# Patient Record
Sex: FEMALE | Race: BLACK OR AFRICAN AMERICAN | Employment: FULL TIME | URBAN - METROPOLITAN AREA
[De-identification: names, ages, dates, MRNs, and addresses within clinical notes are randomized per-mention and may not be internally consistent; named-entity substitution may affect disease eponyms.]

---

## 2018-01-09 ENCOUNTER — OFFICE VISIT (OUTPATIENT)
Dept: FAMILY MEDICINE CLINIC | Age: 38
End: 2018-01-09
Payer: COMMERCIAL

## 2018-01-09 VITALS
WEIGHT: 213.4 LBS | OXYGEN SATURATION: 98 % | DIASTOLIC BLOOD PRESSURE: 72 MMHG | RESPIRATION RATE: 16 BRPM | BODY MASS INDEX: 36.43 KG/M2 | TEMPERATURE: 98.4 F | HEIGHT: 64 IN | SYSTOLIC BLOOD PRESSURE: 120 MMHG | HEART RATE: 80 BPM

## 2018-01-09 DIAGNOSIS — R30.0 DYSURIA: Primary | ICD-10-CM

## 2018-01-09 DIAGNOSIS — N30.90 CYSTITIS: ICD-10-CM

## 2018-01-09 DIAGNOSIS — J06.9 VIRAL URI: ICD-10-CM

## 2018-01-09 LAB
BILIRUBIN, POC: NEGATIVE
BLOOD URINE, POC: ABNORMAL
CLARITY, POC: ABNORMAL
COLOR, POC: ABNORMAL
GLUCOSE URINE, POC: NEGATIVE
KETONES, POC: NEGATIVE
LEUKOCYTE EST, POC: ABNORMAL
NITRITE, POC: NEGATIVE
PH, POC: 5.5
PROTEIN, POC: ABNORMAL
SPECIFIC GRAVITY, POC: 1.02
UROBILINOGEN, POC: ABNORMAL

## 2018-01-09 PROCEDURE — 99213 OFFICE O/P EST LOW 20 MIN: CPT | Performed by: FAMILY MEDICINE

## 2018-01-09 PROCEDURE — 81003 URINALYSIS AUTO W/O SCOPE: CPT | Performed by: FAMILY MEDICINE

## 2018-01-09 RX ORDER — SULFAMETHOXAZOLE AND TRIMETHOPRIM 800; 160 MG/1; MG/1
1 TABLET ORAL 2 TIMES DAILY
Qty: 20 TABLET | Refills: 0 | Status: SHIPPED | OUTPATIENT
Start: 2018-01-09 | End: 2018-01-19

## 2018-01-09 ASSESSMENT — PATIENT HEALTH QUESTIONNAIRE - PHQ9
2. FEELING DOWN, DEPRESSED OR HOPELESS: 0
SUM OF ALL RESPONSES TO PHQ QUESTIONS 1-9: 0
SUM OF ALL RESPONSES TO PHQ9 QUESTIONS 1 & 2: 0
1. LITTLE INTEREST OR PLEASURE IN DOING THINGS: 0

## 2018-01-09 ASSESSMENT — ENCOUNTER SYMPTOMS
WHEEZING: 0
GASTROINTESTINAL NEGATIVE: 1
SORE THROAT: 0
COUGH: 0
SHORTNESS OF BREATH: 0
RHINORRHEA: 1

## 2018-01-09 NOTE — PROGRESS NOTES
Visit Information    Have you changed or started any medications since your last visit including any over-the-counter medicines, vitamins, or herbal medicines? no   Are you having any side effects from any of your medications? -  no  Have you stopped taking any of your medications? Is so, why? -  no    Have you seen any other physician or provider since your last visit? No  Have you had any other diagnostic tests since your last visit? No  Have you been seen in the emergency room and/or had an admission to a hospital since we last saw you? No  Have you had your routine dental cleaning in the past 6 months? no    Have you activated your [employer] account? If not, what are your barriers?  Yes     Patient Care Team:  Elizabeth Arreola MD as PCP - General    Medical History Review  Past Medical, Family, and Social History reviewed and does not contribute to the patient presenting condition    Health Maintenance   Topic Date Due    HIV screen  06/07/1993    DTaP/Tdap/Td vaccine (1 - Tdap) 06/07/1997    Cervical cancer screen  06/24/2018    Flu vaccine  Completed

## 2018-01-11 LAB — URINE CULTURE, ROUTINE: ABNORMAL

## 2018-03-16 ENCOUNTER — HOSPITAL ENCOUNTER (EMERGENCY)
Age: 38
Discharge: HOME | End: 2018-03-16
Payer: COMMERCIAL

## 2018-03-16 VITALS
OXYGEN SATURATION: 98 % | BODY MASS INDEX: 36.54 KG/M2 | DIASTOLIC BLOOD PRESSURE: 83 MMHG | HEART RATE: 94 BPM | RESPIRATION RATE: 16 BRPM | HEIGHT: 64 IN | WEIGHT: 214 LBS | SYSTOLIC BLOOD PRESSURE: 140 MMHG | TEMPERATURE: 97.5 F

## 2018-03-16 DIAGNOSIS — N89.8 VAGINAL DISCHARGE: ICD-10-CM

## 2018-03-16 DIAGNOSIS — B96.89 BACTERIAL VAGINOSIS: Primary | ICD-10-CM

## 2018-03-16 DIAGNOSIS — N76.0 BACTERIAL VAGINOSIS: Primary | ICD-10-CM

## 2018-03-16 LAB
BILIRUBIN URINE: NEGATIVE
BLOOD, URINE: NEGATIVE
CHARACTER, URINE: CLEAR
CHLAMYDIA TRACHOMATIS BY RT-PCR: NOT DETECTED
COLOR: YELLOW
CT/NG SOURCE: NORMAL
GLUCOSE, URINE: NEGATIVE MG/DL
KETONES, URINE: NEGATIVE
LEUKOCYTES, UA: NEGATIVE
NEISSERIA GONORRHOEAE BY RT-PCR: NOT DETECTED
NITRATE, UA: NEGATIVE
PH UA: 6 (ref 5–9)
PROTEIN UA: NEGATIVE MG/DL
REFLEX TO URINE C & S: NORMAL
SPECIFIC GRAVITY UA: 1.02 (ref 1–1.03)
TRICHOMONAS PREP: NEGATIVE
UROBILINOGEN, URINE: 0.2 EU/DL (ref 0–1)

## 2018-03-16 PROCEDURE — 99213 OFFICE O/P EST LOW 20 MIN: CPT

## 2018-03-16 PROCEDURE — 87205 SMEAR GRAM STAIN: CPT

## 2018-03-16 PROCEDURE — 87808 TRICHOMONAS ASSAY W/OPTIC: CPT

## 2018-03-16 PROCEDURE — 81003 URINALYSIS AUTO W/O SCOPE: CPT

## 2018-03-16 PROCEDURE — 87491 CHLMYD TRACH DNA AMP PROBE: CPT

## 2018-03-16 PROCEDURE — 87591 N.GONORRHOEAE DNA AMP PROB: CPT

## 2018-03-16 PROCEDURE — 87070 CULTURE OTHR SPECIMN AEROBIC: CPT

## 2018-03-16 PROCEDURE — 99213 OFFICE O/P EST LOW 20 MIN: CPT | Performed by: NURSE PRACTITIONER

## 2018-03-16 RX ORDER — METRONIDAZOLE 500 MG/1
500 TABLET ORAL 2 TIMES DAILY
Qty: 14 TABLET | Refills: 0 | Status: SHIPPED | OUTPATIENT
Start: 2018-03-16 | End: 2018-03-23

## 2018-03-16 ASSESSMENT — ENCOUNTER SYMPTOMS
WHEEZING: 0
SHORTNESS OF BREATH: 0
SORE THROAT: 0
DIARRHEA: 0
ABDOMINAL PAIN: 0
NAUSEA: 0
COUGH: 0
CONSTIPATION: 0

## 2018-03-19 LAB
GENITAL CULTURE, ROUTINE: NORMAL
GRAM STAIN RESULT: NORMAL

## 2018-06-14 ENCOUNTER — HOSPITAL ENCOUNTER (OUTPATIENT)
Age: 38
Setting detail: OBSERVATION
Discharge: HOME | End: 2018-06-15
Attending: FAMILY MEDICINE | Admitting: INTERNAL MEDICINE
Payer: COMMERCIAL

## 2018-06-14 ENCOUNTER — APPOINTMENT (OUTPATIENT)
Dept: GENERAL RADIOLOGY | Age: 38
End: 2018-06-14
Payer: COMMERCIAL

## 2018-06-14 DIAGNOSIS — I47.1 SVT (SUPRAVENTRICULAR TACHYCARDIA) (HCC): Primary | ICD-10-CM

## 2018-06-14 PROBLEM — I47.10 SVT (SUPRAVENTRICULAR TACHYCARDIA): Status: ACTIVE | Noted: 2018-06-14

## 2018-06-14 LAB
ALBUMIN SERPL-MCNC: 4.1 G/DL (ref 3.5–5.1)
ALP BLD-CCNC: 57 U/L (ref 38–126)
ALT SERPL-CCNC: 18 U/L (ref 11–66)
AMPHETAMINE+METHAMPHETAMINE URINE SCREEN: NEGATIVE
ANION GAP SERPL CALCULATED.3IONS-SCNC: 16 MEQ/L (ref 8–16)
AST SERPL-CCNC: 21 U/L (ref 5–40)
BACTERIA: ABNORMAL /HPF
BARBITURATE QUANTITATIVE URINE: NEGATIVE
BASOPHILS # BLD: 0.6 %
BASOPHILS ABSOLUTE: 0 THOU/MM3 (ref 0–0.1)
BENZODIAZEPINE QUANTITATIVE URINE: NEGATIVE
BILIRUB SERPL-MCNC: 0.7 MG/DL (ref 0.3–1.2)
BILIRUBIN DIRECT: < 0.2 MG/DL (ref 0–0.3)
BILIRUBIN URINE: NEGATIVE
BLOOD, URINE: ABNORMAL
BUN BLDV-MCNC: 14 MG/DL (ref 7–22)
CALCIUM SERPL-MCNC: 8.9 MG/DL (ref 8.5–10.5)
CANNABINOID QUANTITATIVE URINE: NEGATIVE
CASTS 2: ABNORMAL /LPF
CASTS UA: ABNORMAL /LPF
CHARACTER, URINE: CLEAR
CHLORIDE BLD-SCNC: 105 MEQ/L (ref 98–111)
CO2: 22 MEQ/L (ref 23–33)
COCAINE METABOLITE QUANTITATIVE URINE: NEGATIVE
COLOR: YELLOW
CREAT SERPL-MCNC: 0.7 MG/DL (ref 0.4–1.2)
CRYSTALS, UA: ABNORMAL
D-DIMER QUANTITATIVE: 333 NG/ML FEU (ref 0–500)
EOSINOPHIL # BLD: 1.6 %
EOSINOPHILS ABSOLUTE: 0.1 THOU/MM3 (ref 0–0.4)
EPITHELIAL CELLS, UA: ABNORMAL /HPF
GFR SERPL CREATININE-BSD FRML MDRD: > 90 ML/MIN/1.73M2
GLUCOSE BLD-MCNC: 125 MG/DL (ref 70–108)
GLUCOSE URINE: NEGATIVE MG/DL
HCT VFR BLD CALC: 38.6 % (ref 37–47)
HEMOGLOBIN: 13 GM/DL (ref 12–16)
KETONES, URINE: NEGATIVE
LEUKOCYTE ESTERASE, URINE: NEGATIVE
LYMPHOCYTES # BLD: 33.4 %
LYMPHOCYTES ABSOLUTE: 1.6 THOU/MM3 (ref 1–4.8)
MAGNESIUM: 2.1 MG/DL (ref 1.6–2.4)
MCH RBC QN AUTO: 31 PG (ref 27–31)
MCHC RBC AUTO-ENTMCNC: 33.6 GM/DL (ref 33–37)
MCV RBC AUTO: 92.2 FL (ref 81–99)
MISCELLANEOUS 2: ABNORMAL
MONOCYTES # BLD: 5.2 %
MONOCYTES ABSOLUTE: 0.3 THOU/MM3 (ref 0.4–1.3)
NITRITE, URINE: NEGATIVE
NUCLEATED RED BLOOD CELLS: 0 /100 WBC
OPIATES, URINE: NEGATIVE
OSMOLALITY CALCULATION: 286.9 MOSMOL/KG (ref 275–300)
OXYCODONE: NEGATIVE
PDW BLD-RTO: 12.7 % (ref 11.5–14.5)
PH UA: 6
PHENCYCLIDINE QUANTITATIVE URINE: NEGATIVE
PLATELET # BLD: 270 THOU/MM3 (ref 130–400)
PMV BLD AUTO: 8.4 FL (ref 7.4–10.4)
POTASSIUM SERPL-SCNC: 3.4 MEQ/L (ref 3.5–5.2)
PREGNANCY, SERUM: NEGATIVE
PRO-BNP: 33.4 PG/ML (ref 0–450)
PROTEIN UA: NEGATIVE
RBC # BLD: 4.18 MILL/MM3 (ref 4.2–5.4)
RBC URINE: ABNORMAL /HPF
RENAL EPITHELIAL, UA: ABNORMAL
SEG NEUTROPHILS: 59.2 %
SEGMENTED NEUTROPHILS ABSOLUTE COUNT: 2.9 THOU/MM3 (ref 1.8–7.7)
SODIUM BLD-SCNC: 143 MEQ/L (ref 135–145)
SPECIFIC GRAVITY, URINE: 1.01 (ref 1–1.03)
TOTAL PROTEIN: 7.6 G/DL (ref 6.1–8)
TROPONIN T: < 0.01 NG/ML
TSH SERPL DL<=0.05 MIU/L-ACNC: 1.04 UIU/ML (ref 0.4–4.2)
TSH SERPL DL<=0.05 MIU/L-ACNC: 1.06 UIU/ML (ref 0.4–4.2)
UROBILINOGEN, URINE: 0.2 EU/DL
WBC # BLD: 4.9 THOU/MM3 (ref 4.8–10.8)
WBC UA: ABNORMAL /HPF
YEAST: ABNORMAL

## 2018-06-14 PROCEDURE — G0378 HOSPITAL OBSERVATION PER HR: HCPCS

## 2018-06-14 PROCEDURE — 6370000000 HC RX 637 (ALT 250 FOR IP): Performed by: INTERNAL MEDICINE

## 2018-06-14 PROCEDURE — 6360000002 HC RX W HCPCS: Performed by: INTERNAL MEDICINE

## 2018-06-14 PROCEDURE — 36415 COLL VENOUS BLD VENIPUNCTURE: CPT

## 2018-06-14 PROCEDURE — 96374 THER/PROPH/DIAG INJ IV PUSH: CPT

## 2018-06-14 PROCEDURE — 93005 ELECTROCARDIOGRAM TRACING: CPT | Performed by: FAMILY MEDICINE

## 2018-06-14 PROCEDURE — 96372 THER/PROPH/DIAG INJ SC/IM: CPT

## 2018-06-14 PROCEDURE — 83880 ASSAY OF NATRIURETIC PEPTIDE: CPT

## 2018-06-14 PROCEDURE — 84703 CHORIONIC GONADOTROPIN ASSAY: CPT

## 2018-06-14 PROCEDURE — 82248 BILIRUBIN DIRECT: CPT

## 2018-06-14 PROCEDURE — 85379 FIBRIN DEGRADATION QUANT: CPT

## 2018-06-14 PROCEDURE — 80053 COMPREHEN METABOLIC PANEL: CPT

## 2018-06-14 PROCEDURE — 83735 ASSAY OF MAGNESIUM: CPT

## 2018-06-14 PROCEDURE — 84443 ASSAY THYROID STIM HORMONE: CPT

## 2018-06-14 PROCEDURE — 2580000003 HC RX 258: Performed by: INTERNAL MEDICINE

## 2018-06-14 PROCEDURE — 81001 URINALYSIS AUTO W/SCOPE: CPT

## 2018-06-14 PROCEDURE — 99285 EMERGENCY DEPT VISIT HI MDM: CPT

## 2018-06-14 PROCEDURE — 80307 DRUG TEST PRSMV CHEM ANLYZR: CPT

## 2018-06-14 PROCEDURE — 71045 X-RAY EXAM CHEST 1 VIEW: CPT

## 2018-06-14 PROCEDURE — 6360000002 HC RX W HCPCS

## 2018-06-14 PROCEDURE — 84484 ASSAY OF TROPONIN QUANT: CPT

## 2018-06-14 PROCEDURE — 85025 COMPLETE CBC W/AUTO DIFF WBC: CPT

## 2018-06-14 RX ORDER — POTASSIUM CHLORIDE 20 MEQ/1
40 TABLET, EXTENDED RELEASE ORAL ONCE
Status: DISCONTINUED | OUTPATIENT
Start: 2018-06-15 | End: 2018-06-14

## 2018-06-14 RX ORDER — SODIUM CHLORIDE 0.9 % (FLUSH) 0.9 %
10 SYRINGE (ML) INJECTION PRN
Status: DISCONTINUED | OUTPATIENT
Start: 2018-06-14 | End: 2018-06-15 | Stop reason: HOSPADM

## 2018-06-14 RX ORDER — ASPIRIN 81 MG/1
81 TABLET, CHEWABLE ORAL DAILY
Status: DISCONTINUED | OUTPATIENT
Start: 2018-06-14 | End: 2018-06-15 | Stop reason: HOSPADM

## 2018-06-14 RX ORDER — POTASSIUM CHLORIDE 20 MEQ/1
40 TABLET, EXTENDED RELEASE ORAL ONCE
Status: COMPLETED | OUTPATIENT
Start: 2018-06-14 | End: 2018-06-14

## 2018-06-14 RX ORDER — ADENOSINE 3 MG/ML
INJECTION, SOLUTION INTRAVENOUS
Status: COMPLETED
Start: 2018-06-14 | End: 2018-06-14

## 2018-06-14 RX ORDER — SODIUM CHLORIDE 0.9 % (FLUSH) 0.9 %
10 SYRINGE (ML) INJECTION EVERY 12 HOURS SCHEDULED
Status: DISCONTINUED | OUTPATIENT
Start: 2018-06-14 | End: 2018-06-15 | Stop reason: HOSPADM

## 2018-06-14 RX ORDER — CETIRIZINE HYDROCHLORIDE 10 MG/1
10 TABLET ORAL DAILY
COMMUNITY
End: 2018-07-18

## 2018-06-14 RX ORDER — ONDANSETRON 2 MG/ML
4 INJECTION INTRAMUSCULAR; INTRAVENOUS EVERY 6 HOURS PRN
Status: DISCONTINUED | OUTPATIENT
Start: 2018-06-14 | End: 2018-06-15 | Stop reason: HOSPADM

## 2018-06-14 RX ORDER — LANOLIN ALCOHOL/MO/W.PET/CERES
3 CREAM (GRAM) TOPICAL NIGHTLY PRN
COMMUNITY
End: 2018-07-18

## 2018-06-14 RX ORDER — ADENOSINE 3 MG/ML
6 INJECTION, SOLUTION INTRAVENOUS ONCE
Status: COMPLETED | OUTPATIENT
Start: 2018-06-14 | End: 2018-06-14

## 2018-06-14 RX ADMIN — ADENOSINE 6 MG: 3 INJECTION, SOLUTION INTRAVENOUS at 14:38

## 2018-06-14 RX ADMIN — ASPIRIN 81 MG 81 MG: 81 TABLET ORAL at 19:48

## 2018-06-14 RX ADMIN — ENOXAPARIN SODIUM 40 MG: 40 INJECTION SUBCUTANEOUS at 19:48

## 2018-06-14 RX ADMIN — POTASSIUM CHLORIDE 40 MEQ: 20 TABLET, EXTENDED RELEASE ORAL at 21:17

## 2018-06-14 RX ADMIN — Medication 10 ML: at 19:48

## 2018-06-14 ASSESSMENT — ENCOUNTER SYMPTOMS
VOMITING: 0
RHINORRHEA: 0
NAUSEA: 0
COUGH: 0
WHEEZING: 0
EYE DISCHARGE: 0
BACK PAIN: 0
ABDOMINAL PAIN: 0
DIARRHEA: 0
SHORTNESS OF BREATH: 1
EYE PAIN: 0
SORE THROAT: 0

## 2018-06-14 ASSESSMENT — PAIN SCALES - GENERAL: PAINLEVEL_OUTOF10: 0

## 2018-06-15 ENCOUNTER — TELEPHONE (OUTPATIENT)
Dept: CARDIOLOGY CLINIC | Age: 38
End: 2018-06-15

## 2018-06-15 ENCOUNTER — TELEPHONE (OUTPATIENT)
Dept: FAMILY MEDICINE CLINIC | Age: 38
End: 2018-06-15

## 2018-06-15 VITALS
SYSTOLIC BLOOD PRESSURE: 135 MMHG | OXYGEN SATURATION: 100 % | DIASTOLIC BLOOD PRESSURE: 93 MMHG | HEART RATE: 84 BPM | BODY MASS INDEX: 36.06 KG/M2 | HEIGHT: 64 IN | WEIGHT: 211.2 LBS | TEMPERATURE: 97.9 F | RESPIRATION RATE: 16 BRPM

## 2018-06-15 LAB
ANION GAP SERPL CALCULATED.3IONS-SCNC: 12 MEQ/L (ref 8–16)
BUN BLDV-MCNC: 14 MG/DL (ref 7–22)
CALCIUM SERPL-MCNC: 9 MG/DL (ref 8.5–10.5)
CHLORIDE BLD-SCNC: 106 MEQ/L (ref 98–111)
CO2: 24 MEQ/L (ref 23–33)
CREAT SERPL-MCNC: 0.6 MG/DL (ref 0.4–1.2)
EKG ATRIAL RATE: 115 BPM
EKG ATRIAL RATE: 200 BPM
EKG P AXIS: 37 DEGREES
EKG P-R INTERVAL: 144 MS
EKG Q-T INTERVAL: 226 MS
EKG Q-T INTERVAL: 316 MS
EKG QRS DURATION: 90 MS
EKG QRS DURATION: 96 MS
EKG QTC CALCULATION (BAZETT): 412 MS
EKG QTC CALCULATION (BAZETT): 437 MS
EKG R AXIS: 2 DEGREES
EKG R AXIS: 4 DEGREES
EKG T AXIS: -7 DEGREES
EKG T AXIS: 24 DEGREES
EKG VENTRICULAR RATE: 115 BPM
EKG VENTRICULAR RATE: 200 BPM
GFR SERPL CREATININE-BSD FRML MDRD: > 90 ML/MIN/1.73M2
GLUCOSE BLD-MCNC: 101 MG/DL (ref 70–108)
HCT VFR BLD CALC: 34.9 % (ref 37–47)
HEMOGLOBIN: 12.2 GM/DL (ref 12–16)
LV EF: 53 %
LVEF MODALITY: NORMAL
MCH RBC QN AUTO: 31.9 PG (ref 27–31)
MCHC RBC AUTO-ENTMCNC: 35 GM/DL (ref 33–37)
MCV RBC AUTO: 91.1 FL (ref 81–99)
PDW BLD-RTO: 13.2 % (ref 11.5–14.5)
PLATELET # BLD: 266 THOU/MM3 (ref 130–400)
PMV BLD AUTO: 8.1 FL (ref 7.4–10.4)
POTASSIUM REFLEX MAGNESIUM: 4.2 MEQ/L (ref 3.5–5.2)
RBC # BLD: 3.83 MILL/MM3 (ref 4.2–5.4)
SODIUM BLD-SCNC: 142 MEQ/L (ref 135–145)
WBC # BLD: 6.7 THOU/MM3 (ref 4.8–10.8)

## 2018-06-15 PROCEDURE — 99223 1ST HOSP IP/OBS HIGH 75: CPT | Performed by: INTERNAL MEDICINE

## 2018-06-15 PROCEDURE — 80048 BASIC METABOLIC PNL TOTAL CA: CPT

## 2018-06-15 PROCEDURE — G0378 HOSPITAL OBSERVATION PER HR: HCPCS

## 2018-06-15 PROCEDURE — 6370000000 HC RX 637 (ALT 250 FOR IP): Performed by: INTERNAL MEDICINE

## 2018-06-15 PROCEDURE — 36415 COLL VENOUS BLD VENIPUNCTURE: CPT

## 2018-06-15 PROCEDURE — 93010 ELECTROCARDIOGRAM REPORT: CPT | Performed by: INTERNAL MEDICINE

## 2018-06-15 PROCEDURE — 93270 REMOTE 30 DAY ECG REV/REPORT: CPT

## 2018-06-15 PROCEDURE — 85027 COMPLETE CBC AUTOMATED: CPT

## 2018-06-15 PROCEDURE — 2580000003 HC RX 258: Performed by: INTERNAL MEDICINE

## 2018-06-15 PROCEDURE — 93306 TTE W/DOPPLER COMPLETE: CPT

## 2018-06-15 RX ORDER — ASPIRIN 81 MG/1
81 TABLET, CHEWABLE ORAL DAILY
Qty: 30 TABLET | Refills: 3 | Status: SHIPPED | OUTPATIENT
Start: 2018-06-16 | End: 2018-07-18

## 2018-06-15 RX ADMIN — Medication 10 ML: at 09:10

## 2018-06-15 RX ADMIN — ASPIRIN 81 MG 81 MG: 81 TABLET ORAL at 09:11

## 2018-06-15 ASSESSMENT — PAIN SCALES - GENERAL
PAINLEVEL_OUTOF10: 0

## 2018-06-15 NOTE — TELEPHONE ENCOUNTER
.Transition of Care visit scheduled.   6/26/2018  Patient is being discharged to home from [address] [place] [address],[address]

## 2018-06-16 ENCOUNTER — CARE COORDINATION (OUTPATIENT)
Dept: CASE MANAGEMENT | Age: 38
End: 2018-06-16

## 2018-06-16 DIAGNOSIS — I47.1 SVT (SUPRAVENTRICULAR TACHYCARDIA) (HCC): Primary | ICD-10-CM

## 2018-06-16 PROCEDURE — 1111F DSCHRG MED/CURRENT MED MERGE: CPT | Performed by: FAMILY MEDICINE

## 2018-07-18 ENCOUNTER — OFFICE VISIT (OUTPATIENT)
Dept: CARDIOLOGY CLINIC | Age: 38
End: 2018-07-18
Payer: COMMERCIAL

## 2018-07-18 VITALS
HEIGHT: 65 IN | BODY MASS INDEX: 35.49 KG/M2 | WEIGHT: 213 LBS | HEART RATE: 90 BPM | SYSTOLIC BLOOD PRESSURE: 130 MMHG | DIASTOLIC BLOOD PRESSURE: 88 MMHG

## 2018-07-18 DIAGNOSIS — I47.1 PAROXYSMAL SVT (SUPRAVENTRICULAR TACHYCARDIA) (HCC): Primary | ICD-10-CM

## 2018-07-18 PROCEDURE — 99213 OFFICE O/P EST LOW 20 MIN: CPT | Performed by: PHYSICIAN ASSISTANT

## 2019-02-05 ENCOUNTER — HOSPITAL ENCOUNTER (OUTPATIENT)
Dept: WOMENS IMAGING | Age: 39
Discharge: HOME | End: 2019-02-05
Payer: COMMERCIAL

## 2019-02-05 DIAGNOSIS — Z12.31 VISIT FOR SCREENING MAMMOGRAM: ICD-10-CM

## 2019-02-05 PROCEDURE — 77063 BREAST TOMOSYNTHESIS BI: CPT

## 2019-07-01 ENCOUNTER — OFFICE VISIT (OUTPATIENT)
Dept: FAMILY MEDICINE CLINIC | Age: 39
End: 2019-07-01
Payer: COMMERCIAL

## 2019-07-01 VITALS
RESPIRATION RATE: 16 BRPM | TEMPERATURE: 98.1 F | HEIGHT: 67 IN | SYSTOLIC BLOOD PRESSURE: 136 MMHG | DIASTOLIC BLOOD PRESSURE: 80 MMHG | HEART RATE: 104 BPM | BODY MASS INDEX: 33.74 KG/M2 | WEIGHT: 215 LBS

## 2019-07-01 DIAGNOSIS — Z13.31 POSITIVE DEPRESSION SCREENING: ICD-10-CM

## 2019-07-01 DIAGNOSIS — F34.1 DYSTHYMIA: Primary | ICD-10-CM

## 2019-07-01 PROCEDURE — G8431 POS CLIN DEPRES SCRN F/U DOC: HCPCS | Performed by: FAMILY MEDICINE

## 2019-07-01 PROCEDURE — 99213 OFFICE O/P EST LOW 20 MIN: CPT | Performed by: FAMILY MEDICINE

## 2019-07-01 ASSESSMENT — PATIENT HEALTH QUESTIONNAIRE - PHQ9
SUM OF ALL RESPONSES TO PHQ QUESTIONS 1-9: 16
7. TROUBLE CONCENTRATING ON THINGS, SUCH AS READING THE NEWSPAPER OR WATCHING TELEVISION: 2
5. POOR APPETITE OR OVEREATING: 2
10. IF YOU CHECKED OFF ANY PROBLEMS, HOW DIFFICULT HAVE THESE PROBLEMS MADE IT FOR YOU TO DO YOUR WORK, TAKE CARE OF THINGS AT HOME, OR GET ALONG WITH OTHER PEOPLE: 1
3. TROUBLE FALLING OR STAYING ASLEEP: 2
9. THOUGHTS THAT YOU WOULD BE BETTER OFF DEAD, OR OF HURTING YOURSELF: 0
8. MOVING OR SPEAKING SO SLOWLY THAT OTHER PEOPLE COULD HAVE NOTICED. OR THE OPPOSITE, BEING SO FIGETY OR RESTLESS THAT YOU HAVE BEEN MOVING AROUND A LOT MORE THAN USUAL: 2
SUM OF ALL RESPONSES TO PHQ QUESTIONS 1-9: 16
2. FEELING DOWN, DEPRESSED OR HOPELESS: 2
4. FEELING TIRED OR HAVING LITTLE ENERGY: 2
6. FEELING BAD ABOUT YOURSELF - OR THAT YOU ARE A FAILURE OR HAVE LET YOURSELF OR YOUR FAMILY DOWN: 2
SUM OF ALL RESPONSES TO PHQ9 QUESTIONS 1 & 2: 4
1. LITTLE INTEREST OR PLEASURE IN DOING THINGS: 2

## 2019-07-01 ASSESSMENT — ENCOUNTER SYMPTOMS: GASTROINTESTINAL NEGATIVE: 1

## 2019-07-02 ENCOUNTER — TELEPHONE (OUTPATIENT)
Dept: FAMILY MEDICINE CLINIC | Age: 39
End: 2019-07-02

## 2019-08-14 ENCOUNTER — NURSE ONLY (OUTPATIENT)
Dept: LAB | Age: 39
End: 2019-08-14

## 2019-08-16 LAB — HIV-2 AB: NEGATIVE

## 2019-09-09 ENCOUNTER — OFFICE VISIT (OUTPATIENT)
Dept: FAMILY MEDICINE CLINIC | Age: 39
End: 2019-09-09
Payer: COMMERCIAL

## 2019-09-09 VITALS
RESPIRATION RATE: 16 BRPM | HEART RATE: 76 BPM | WEIGHT: 207 LBS | BODY MASS INDEX: 35.34 KG/M2 | SYSTOLIC BLOOD PRESSURE: 122 MMHG | HEIGHT: 64 IN | DIASTOLIC BLOOD PRESSURE: 68 MMHG

## 2019-09-09 DIAGNOSIS — F34.1 DYSTHYMIA: ICD-10-CM

## 2019-09-09 DIAGNOSIS — Z13.31 POSITIVE DEPRESSION SCREENING: ICD-10-CM

## 2019-09-09 DIAGNOSIS — F43.12 CHRONIC POST-TRAUMATIC STRESS DISORDER (PTSD): Primary | ICD-10-CM

## 2019-09-09 PROCEDURE — 99213 OFFICE O/P EST LOW 20 MIN: CPT | Performed by: FAMILY MEDICINE
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
## 2019-10-31 ENCOUNTER — APPOINTMENT (OUTPATIENT)
Dept: GENERAL RADIOLOGY | Age: 39
End: 2019-10-31
Payer: COMMERCIAL

## 2019-10-31 ENCOUNTER — HOSPITAL ENCOUNTER (EMERGENCY)
Age: 39
Discharge: HOME | End: 2019-10-31
Attending: EMERGENCY MEDICINE
Payer: COMMERCIAL

## 2019-10-31 VITALS
WEIGHT: 206 LBS | OXYGEN SATURATION: 100 % | TEMPERATURE: 98.1 F | HEART RATE: 100 BPM | DIASTOLIC BLOOD PRESSURE: 81 MMHG | SYSTOLIC BLOOD PRESSURE: 122 MMHG | RESPIRATION RATE: 18 BRPM | BODY MASS INDEX: 35.17 KG/M2 | HEIGHT: 64 IN

## 2019-10-31 DIAGNOSIS — F41.9 ANXIETY DISORDER, UNSPECIFIED TYPE: Primary | ICD-10-CM

## 2019-10-31 DIAGNOSIS — R07.89 ATYPICAL CHEST PAIN: ICD-10-CM

## 2019-10-31 LAB
ALBUMIN SERPL-MCNC: 4.5 G/DL (ref 3.5–5.1)
ALP BLD-CCNC: 67 U/L (ref 38–126)
ALT SERPL-CCNC: 26 U/L (ref 11–66)
ANION GAP SERPL CALCULATED.3IONS-SCNC: 16 MEQ/L (ref 8–16)
AST SERPL-CCNC: 31 U/L (ref 5–40)
BASOPHILS # BLD: 0.1 %
BASOPHILS ABSOLUTE: 0 THOU/MM3 (ref 0–0.1)
BILIRUB SERPL-MCNC: 0.8 MG/DL (ref 0.3–1.2)
BUN BLDV-MCNC: 21 MG/DL (ref 7–22)
CALCIUM SERPL-MCNC: 9.6 MG/DL (ref 8.5–10.5)
CHLORIDE BLD-SCNC: 102 MEQ/L (ref 98–111)
CO2: 20 MEQ/L (ref 23–33)
CREAT SERPL-MCNC: 0.8 MG/DL (ref 0.4–1.2)
D-DIMER QUANTITATIVE: 295 NG/ML FEU (ref 0–500)
EKG ATRIAL RATE: 110 BPM
EKG P AXIS: 58 DEGREES
EKG P-R INTERVAL: 172 MS
EKG Q-T INTERVAL: 316 MS
EKG QRS DURATION: 92 MS
EKG QTC CALCULATION (BAZETT): 427 MS
EKG R AXIS: 16 DEGREES
EKG T AXIS: 37 DEGREES
EKG VENTRICULAR RATE: 110 BPM
EOSINOPHIL # BLD: 1.4 %
EOSINOPHILS ABSOLUTE: 0.1 THOU/MM3 (ref 0–0.4)
ERYTHROCYTE [DISTWIDTH] IN BLOOD BY AUTOMATED COUNT: 12.8 % (ref 11.5–14.5)
ERYTHROCYTE [DISTWIDTH] IN BLOOD BY AUTOMATED COUNT: 44.3 FL (ref 35–45)
GFR SERPL CREATININE-BSD FRML MDRD: > 90 ML/MIN/1.73M2
GLUCOSE BLD-MCNC: 119 MG/DL (ref 70–108)
HCT VFR BLD CALC: 42 % (ref 37–47)
HEMOGLOBIN: 14 GM/DL (ref 12–16)
IMMATURE GRANS (ABS): 0.01 THOU/MM3 (ref 0–0.07)
IMMATURE GRANULOCYTES: 0.1 %
LYMPHOCYTES # BLD: 49.6 %
LYMPHOCYTES ABSOLUTE: 3.9 THOU/MM3 (ref 1–4.8)
MCH RBC QN AUTO: 31.5 PG (ref 26–33)
MCHC RBC AUTO-ENTMCNC: 33.3 GM/DL (ref 32.2–35.5)
MCV RBC AUTO: 94.4 FL (ref 81–99)
MONOCYTES # BLD: 4.9 %
MONOCYTES ABSOLUTE: 0.4 THOU/MM3 (ref 0.4–1.3)
NUCLEATED RED BLOOD CELLS: 0 /100 WBC
OSMOLALITY CALCULATION: 279.8 MOSMOL/KG (ref 275–300)
PLATELET # BLD: 321 THOU/MM3 (ref 130–400)
PMV BLD AUTO: 9.6 FL (ref 9.4–12.4)
POTASSIUM REFLEX MAGNESIUM: 3.8 MEQ/L (ref 3.5–5.2)
POTASSIUM SERPL-SCNC: 3.8 MEQ/L (ref 3.5–5.2)
PRO-BNP: < 5 PG/ML (ref 0–450)
RBC # BLD: 4.45 MILL/MM3 (ref 4.2–5.4)
SEG NEUTROPHILS: 43.9 %
SEGMENTED NEUTROPHILS ABSOLUTE COUNT: 3.4 THOU/MM3 (ref 1.8–7.7)
SODIUM BLD-SCNC: 138 MEQ/L (ref 135–145)
TOTAL PROTEIN: 8.1 G/DL (ref 6.1–8)
TROPONIN T: < 0.01 NG/ML
WBC # BLD: 7.8 THOU/MM3 (ref 4.8–10.8)

## 2019-10-31 PROCEDURE — 99285 EMERGENCY DEPT VISIT HI MDM: CPT

## 2019-10-31 PROCEDURE — 93005 ELECTROCARDIOGRAM TRACING: CPT | Performed by: EMERGENCY MEDICINE

## 2019-10-31 PROCEDURE — 80048 BASIC METABOLIC PNL TOTAL CA: CPT

## 2019-10-31 PROCEDURE — 80053 COMPREHEN METABOLIC PANEL: CPT

## 2019-10-31 PROCEDURE — 36415 COLL VENOUS BLD VENIPUNCTURE: CPT

## 2019-10-31 PROCEDURE — 83880 ASSAY OF NATRIURETIC PEPTIDE: CPT

## 2019-10-31 PROCEDURE — 85379 FIBRIN DEGRADATION QUANT: CPT

## 2019-10-31 PROCEDURE — 85025 COMPLETE CBC W/AUTO DIFF WBC: CPT

## 2019-10-31 PROCEDURE — 71046 X-RAY EXAM CHEST 2 VIEWS: CPT

## 2019-10-31 PROCEDURE — 84484 ASSAY OF TROPONIN QUANT: CPT

## 2019-10-31 RX ORDER — BUSPIRONE HYDROCHLORIDE 10 MG/1
10 TABLET ORAL 2 TIMES DAILY
Qty: 60 TABLET | Refills: 0 | Status: SHIPPED | OUTPATIENT
Start: 2019-10-31 | End: 2019-11-30

## 2019-10-31 ASSESSMENT — ENCOUNTER SYMPTOMS
VOICE CHANGE: 0
DIARRHEA: 0
SHORTNESS OF BREATH: 1
TROUBLE SWALLOWING: 0
CHEST TIGHTNESS: 1
WHEEZING: 0
RHINORRHEA: 0
ABDOMINAL PAIN: 0
VOMITING: 0
SINUS PRESSURE: 0
SORE THROAT: 0
BACK PAIN: 0
COUGH: 0
CONSTIPATION: 0
NAUSEA: 0

## 2019-10-31 ASSESSMENT — PAIN DESCRIPTION - DESCRIPTORS: DESCRIPTORS: PRESSURE

## 2019-10-31 ASSESSMENT — PAIN DESCRIPTION - LOCATION: LOCATION: CHEST

## 2019-10-31 ASSESSMENT — PAIN DESCRIPTION - FREQUENCY: FREQUENCY: CONTINUOUS

## 2019-10-31 ASSESSMENT — PAIN SCALES - GENERAL: PAINLEVEL_OUTOF10: 8

## 2019-10-31 ASSESSMENT — PAIN DESCRIPTION - PAIN TYPE: TYPE: ACUTE PAIN

## 2019-11-01 ENCOUNTER — TELEPHONE (OUTPATIENT)
Dept: FAMILY MEDICINE CLINIC | Age: 39
End: 2019-11-01

## 2019-11-02 PROCEDURE — 93010 ELECTROCARDIOGRAM REPORT: CPT | Performed by: INTERNAL MEDICINE

## 2019-11-05 ENCOUNTER — HOSPITAL ENCOUNTER (EMERGENCY)
Age: 39
Discharge: HOME | End: 2019-11-05
Payer: COMMERCIAL

## 2019-11-05 VITALS
SYSTOLIC BLOOD PRESSURE: 145 MMHG | HEIGHT: 64 IN | WEIGHT: 206 LBS | RESPIRATION RATE: 16 BRPM | TEMPERATURE: 97.9 F | BODY MASS INDEX: 35.17 KG/M2 | HEART RATE: 84 BPM | DIASTOLIC BLOOD PRESSURE: 89 MMHG | OXYGEN SATURATION: 99 %

## 2019-11-05 DIAGNOSIS — R59.9 LYMPH NODE ENLARGEMENT: Primary | ICD-10-CM

## 2019-11-05 LAB
BASOPHILS # BLD: 0.2 %
BASOPHILS ABSOLUTE: 0 THOU/MM3 (ref 0–0.1)
BUN WHOLE BLOOD, UC: 13 MG/DL (ref 8–26)
CHLORIDE, WHOLE BLOOD: 103 MEQ/L (ref 98–109)
CO2, WHOLE BLOOD: 25 MEQ/L (ref 23–33)
CREATININE WHOLE BLOOD, UC: 0.7 MG/DL (ref 0.5–1.2)
EOSINOPHIL # BLD: 0.3 %
EOSINOPHILS ABSOLUTE: 0 THOU/MM3 (ref 0–0.4)
GFR, ESTIMATED: > 90 ML/MIN/1.73M2
GLUCOSE, WHOLE BLOOD: 94 MG/DL (ref 70–108)
HCT VFR BLD CALC: 36.9 % (ref 37–47)
HEMOGLOBIN: 12.1 GM/DL (ref 12–16)
IMMATURE GRANS (ABS): 0.02 THOU/MM3 (ref 0–0.07)
IMMATURE GRANULOCYTES: 0.3 %
LYMPHOCYTES # BLD: 25.4 %
LYMPHOCYTES ABSOLUTE: 1.4 THOU/MM3 (ref 1–4.8)
MCH RBC QN AUTO: 30.8 PG (ref 27–31)
MCHC RBC AUTO-ENTMCNC: 32.9 GM/DL (ref 33–37)
MCV RBC AUTO: 94 FL (ref 81–99)
MONOCYTES # BLD: 10.8 %
MONOCYTES ABSOLUTE: 0.6 THOU/MM3 (ref 0.4–1.3)
NUCLEATED RED BLOOD CELLS: 0 /100 WBC
PDW BLD-RTO: 13.6 % (ref 11.5–14.5)
PLATELET # BLD: 257 THOU/MM3 (ref 130–400)
PMV BLD AUTO: 7.4 FL (ref 7.4–10.4)
POTASSIUM, WHOLE BLOOD: 3.6 MEQ/L (ref 3.5–4.9)
RBC # BLD: 3.94 MILL/MM3 (ref 4.2–5.4)
SEG NEUTROPHILS: 63 %
SEGMENTED NEUTROPHILS ABSOLUTE COUNT: 3.5 THOU/MM3 (ref 1.8–7.7)
SODIUM, WHOLE BLOOD: 138 MEQ/L (ref 138–146)
WBC # BLD: 5.6 THOU/MM3 (ref 4.8–10.8)

## 2019-11-05 PROCEDURE — 82565 ASSAY OF CREATININE: CPT

## 2019-11-05 PROCEDURE — 80051 ELECTROLYTE PANEL: CPT

## 2019-11-05 PROCEDURE — 84520 ASSAY OF UREA NITROGEN: CPT

## 2019-11-05 PROCEDURE — 2500000003 HC RX 250 WO HCPCS: Performed by: NURSE PRACTITIONER

## 2019-11-05 PROCEDURE — 2709999900 HC NON-CHARGEABLE SUPPLY

## 2019-11-05 PROCEDURE — 6360000002 HC RX W HCPCS: Performed by: NURSE PRACTITIONER

## 2019-11-05 PROCEDURE — 85025 COMPLETE CBC W/AUTO DIFF WBC: CPT

## 2019-11-05 PROCEDURE — 36415 COLL VENOUS BLD VENIPUNCTURE: CPT

## 2019-11-05 PROCEDURE — 96372 THER/PROPH/DIAG INJ SC/IM: CPT

## 2019-11-05 PROCEDURE — 99214 OFFICE O/P EST MOD 30 MIN: CPT

## 2019-11-05 PROCEDURE — 99214 OFFICE O/P EST MOD 30 MIN: CPT | Performed by: NURSE PRACTITIONER

## 2019-11-05 PROCEDURE — 82947 ASSAY GLUCOSE BLOOD QUANT: CPT

## 2019-11-05 RX ORDER — AMOXICILLIN AND CLAVULANATE POTASSIUM 875; 125 MG/1; MG/1
1 TABLET, FILM COATED ORAL 2 TIMES DAILY WITH MEALS
Qty: 20 TABLET | Refills: 0 | Status: SHIPPED | OUTPATIENT
Start: 2019-11-05 | End: 2019-11-15

## 2019-11-05 RX ORDER — CALCIUM CARBONATE 200(500)MG
1 TABLET,CHEWABLE ORAL DAILY
COMMUNITY
End: 2020-03-01

## 2019-11-05 RX ADMIN — LIDOCAINE HYDROCHLORIDE 1 G: 10 INJECTION, SOLUTION EPIDURAL; INFILTRATION; INTRACAUDAL; PERINEURAL at 13:51

## 2019-11-05 ASSESSMENT — PAIN DESCRIPTION - DESCRIPTORS: DESCRIPTORS: ACHING

## 2019-11-05 ASSESSMENT — ENCOUNTER SYMPTOMS
FACIAL SWELLING: 1
NAUSEA: 1
ABDOMINAL PAIN: 0
SORE THROAT: 0
DIARRHEA: 0
VOMITING: 0
SHORTNESS OF BREATH: 0
CHEST TIGHTNESS: 0

## 2019-11-05 ASSESSMENT — PAIN DESCRIPTION - LOCATION: LOCATION: JAW

## 2019-11-05 ASSESSMENT — PAIN DESCRIPTION - PROGRESSION: CLINICAL_PROGRESSION: GRADUALLY WORSENING

## 2019-11-05 ASSESSMENT — PAIN DESCRIPTION - ONSET: ONSET: ON-GOING

## 2019-11-05 ASSESSMENT — PAIN SCALES - GENERAL: PAINLEVEL_OUTOF10: 7

## 2019-11-05 ASSESSMENT — PAIN DESCRIPTION - ORIENTATION: ORIENTATION: RIGHT

## 2019-11-05 ASSESSMENT — PAIN DESCRIPTION - PAIN TYPE: TYPE: ACUTE PAIN

## 2019-11-05 ASSESSMENT — PAIN DESCRIPTION - FREQUENCY: FREQUENCY: CONTINUOUS

## 2019-11-06 ENCOUNTER — TELEPHONE (OUTPATIENT)
Dept: FAMILY MEDICINE CLINIC | Age: 39
End: 2019-11-06

## 2020-03-01 ENCOUNTER — HOSPITAL ENCOUNTER (EMERGENCY)
Age: 40
Discharge: HOME | End: 2020-03-01
Payer: COMMERCIAL

## 2020-03-01 VITALS
WEIGHT: 198 LBS | SYSTOLIC BLOOD PRESSURE: 150 MMHG | DIASTOLIC BLOOD PRESSURE: 86 MMHG | RESPIRATION RATE: 16 BRPM | HEIGHT: 64 IN | TEMPERATURE: 98.1 F | OXYGEN SATURATION: 97 % | BODY MASS INDEX: 33.8 KG/M2 | HEART RATE: 79 BPM

## 2020-03-01 LAB
FLU A ANTIGEN: NEGATIVE
FLU B ANTIGEN: NEGATIVE

## 2020-03-01 PROCEDURE — 87804 INFLUENZA ASSAY W/OPTIC: CPT

## 2020-03-01 PROCEDURE — 99214 OFFICE O/P EST MOD 30 MIN: CPT

## 2020-03-01 PROCEDURE — 99213 OFFICE O/P EST LOW 20 MIN: CPT | Performed by: NURSE PRACTITIONER

## 2020-03-01 RX ORDER — AZELASTINE 1 MG/ML
1 SPRAY, METERED NASAL 2 TIMES DAILY
Qty: 1 BOTTLE | Refills: 0 | Status: SHIPPED | OUTPATIENT
Start: 2020-03-01 | End: 2020-10-14

## 2020-03-01 RX ORDER — ACETAMINOPHEN 500 MG
TABLET ORAL EVERY 6 HOURS PRN
COMMUNITY

## 2020-03-01 RX ORDER — BENZONATATE 200 MG/1
200 CAPSULE ORAL 3 TIMES DAILY PRN
Qty: 21 CAPSULE | Refills: 0 | Status: SHIPPED | OUTPATIENT
Start: 2020-03-01 | End: 2020-03-08

## 2020-03-01 RX ORDER — ONDANSETRON 4 MG/1
4 TABLET, ORALLY DISINTEGRATING ORAL EVERY 8 HOURS PRN
Qty: 15 TABLET | Refills: 0 | Status: SHIPPED | OUTPATIENT
Start: 2020-03-01 | End: 2021-02-20 | Stop reason: ALTCHOICE

## 2020-03-01 ASSESSMENT — ENCOUNTER SYMPTOMS
ABDOMINAL PAIN: 0
COUGH: 0
APNEA: 0
VOMITING: 0
CONSTIPATION: 0
WHEEZING: 0
SORE THROAT: 0
NAUSEA: 1
DIARRHEA: 0
ANAL BLEEDING: 0
ABDOMINAL DISTENTION: 0
CHEST TIGHTNESS: 0
STRIDOR: 0
RECTAL PAIN: 0
SINUS PRESSURE: 1
CHOKING: 0
RHINORRHEA: 1
FLU SYMPTOMS: 1
SHORTNESS OF BREATH: 0
BLOOD IN STOOL: 0

## 2020-03-01 ASSESSMENT — PAIN DESCRIPTION - PROGRESSION: CLINICAL_PROGRESSION: GRADUALLY WORSENING

## 2020-03-01 ASSESSMENT — PAIN DESCRIPTION - ONSET: ONSET: GRADUAL

## 2020-03-01 ASSESSMENT — PAIN DESCRIPTION - PAIN TYPE: TYPE: ACUTE PAIN

## 2020-03-01 ASSESSMENT — PAIN DESCRIPTION - DESCRIPTORS: DESCRIPTORS: ACHING

## 2020-03-01 ASSESSMENT — PAIN SCALES - GENERAL: PAINLEVEL_OUTOF10: 7

## 2020-03-01 ASSESSMENT — PAIN DESCRIPTION - FREQUENCY: FREQUENCY: CONTINUOUS

## 2020-03-01 ASSESSMENT — PAIN - FUNCTIONAL ASSESSMENT: PAIN_FUNCTIONAL_ASSESSMENT: ACTIVITIES ARE NOT PREVENTED

## 2020-03-01 NOTE — ED NOTES
PT GIVEN DISCHARGE INSTRUCTIONS, VERBALIZES UNDERSTANDING. PT ASSESSMENT UNCHANGED, DISCHARGED IN STABLE CONDITION.         Dung Simeon RN  03/01/20 6839

## 2020-03-01 NOTE — ED PROVIDER NOTES
[facility]  Urgent Care Encounter      CHIEF COMPLAINT       Chief Complaint   Patient presents with    Fatigue    Headache    Nausea    Generalized Body Aches       Nurses Notes reviewed and I agree except as noted in the HPI. HISTORY OFPRESENT ILLNESS   Kasandra Arteaga is a 37 y.o. The history is provided by the patient. No  was used. Influenza   Presenting symptoms: fatigue, headache, myalgias, nausea and rhinorrhea    Presenting symptoms: no cough, no diarrhea, no fever, no shortness of breath, no sore throat and no vomiting    Severity:  Moderate  Onset quality:  Sudden  Duration:  2 days  Progression:  Worsening  Relieved by:  Nothing  Worsened by:  Nothing  Ineffective treatments:  OTC medications  Associated symptoms: chills, decreased appetite, decreased physical activity and nasal congestion    Associated symptoms: no ear pain, no mental status change, no neck stiffness and no syncope    Risk factors: sick contacts        REVIEW OF SYSTEMS     Review of Systems   Constitutional: Positive for activity change, appetite change, chills, decreased appetite and fatigue. Negative for diaphoresis and fever. HENT: Positive for congestion, postnasal drip, rhinorrhea, sinus pressure and sneezing. Negative for ear pain and sore throat. Respiratory: Negative for apnea, cough, choking, chest tightness, shortness of breath, wheezing and stridor. Cardiovascular: Negative for chest pain, palpitations and leg swelling. Gastrointestinal: Positive for nausea. Negative for abdominal distention, abdominal pain, anal bleeding, blood in stool, constipation, diarrhea, rectal pain and vomiting. Musculoskeletal: Positive for myalgias. Negative for neck stiffness. Neurological: Positive for headaches. Psychiatric/Behavioral: Positive for sleep disturbance.        PAST MEDICAL HISTORY         Diagnosis Date    Concussion     Conversion disorder with weakness or paralysis, acute episode     Expressive aphasia     Pneumonia        SURGICAL HISTORY     Patient  has a past surgical history that includes Tubal ligation. CURRENT MEDICATIONS       Discharge Medication List as of 3/1/2020  1:45 PM      CONTINUE these medications which have NOT CHANGED    Details   acetaminophen (TYLENOL) 500 MG tablet Take 1,000 mg by mouth every 6 hours as needed for PainHistorical Med      OLIVE LEAF PO Take by mouthHistorical Med      Multiple Vitamins-Minerals (MULTIVITAMIN ADULT EXTRA C PO) Take by mouthHistorical Med             ALLERGIES     Patient is is allergic to adhesive tape. FAMILY HISTORY     Patient's family history includes Cancer in her father, maternal grandfather, maternal grandmother, paternal grandfather, and paternal grandmother; Depression in her maternal grandfather; Early Death in an other family member; Heart Disease in an other family member; High Blood Pressure in her father, mother, and sister; High Cholesterol in her mother; Kidney Disease in her father; Other in her sister. SOCIAL HISTORY     Patient  reports that she has never smoked. She has never used smokeless tobacco. She reports current alcohol use. She reports that she does not use drugs. PHYSICAL EXAM     ED TRIAGE VITALS  BP: (!) 150/86, Temp: 98.1 °F (36.7 °C), Pulse: 79, Resp: 16, SpO2: 97 %  Physical Exam  Vitals signs and nursing note reviewed. Constitutional:       Appearance: Normal appearance. She is obese. HENT:      Head: Normocephalic and atraumatic. Right Ear: Tympanic membrane, ear canal and external ear normal.      Left Ear: Tympanic membrane, ear canal and external ear normal.      Nose: Congestion and rhinorrhea present. Right Nostril: Occlusion present. Left Nostril: Occlusion present. Mouth/Throat:      Lips: Pink. Mouth: Mucous membranes are moist.      Pharynx: Oropharynx is clear. Uvula midline.  No pharyngeal swelling, oropharyngeal exudate, posterior oropharyngeal erythema or uvula swelling. Eyes:      Extraocular Movements: Extraocular movements intact. Conjunctiva/sclera: Conjunctivae normal.   Neck:      Musculoskeletal: Normal range of motion. Pulmonary:      Effort: Pulmonary effort is normal.   Musculoskeletal: Normal range of motion. Skin:     General: Skin is warm. Neurological:      General: No focal deficit present. Mental Status: She is alert and oriented to person, place, and time. Psychiatric:         Mood and Affect: Mood normal.         Behavior: Behavior normal.         Thought Content: Thought content normal.         Judgment: Judgment normal.         DIAGNOSTIC RESULTS   Labs:  Results for orders placed or performed during the hospital encounter of 03/01/20   Rapid influenza A/B antigens   Result Value Ref Range    Flu A Antigen NEGATIVE NEGATIVE    Flu B Antigen NEGATIVE NEGATIVE       IMAGING:  No orders to display     URGENT CARE COURSE:     Vitals:    03/01/20 1300   BP: (!) 150/86   Pulse: 79   Resp: 16   Temp: 98.1 °F (36.7 °C)   TempSrc: Temporal   SpO2: 97%   Weight: 198 lb (89.8 kg)   Height: 5' 4\" (1.626 m)       Medications - No data to display  PROCEDURES:  None  FINAL IMPRESSION      1. Viral sinusitis        DISPOSITION/PLAN   Decision To Discharge    Drink lots of fluids  Take Motrin or Tylenol for headache  Humidification of air  Use nasal spray as directed  Take a nasal decongestant as directed  Monitor for any fever increased and sinus pain or pressure  Follow-up see her primary care provider in 48 hours  Or go to the emergency department for any changes or concerns.       PATIENT REFERRED TO:  Kem Dawn MD  [facility], [address] [place] [address]  [phone]      As needed    DISCHARGE MEDICATIONS:  Discharge Medication List as of 3/1/2020  1:45 PM      START taking these medications    Details   azelastine (ASTELIN) 0.1 % nasal spray 1 spray by Nasal route 2 times daily Use in each nostril as directed, Disp-1 Bottle, R-0Normal      benzonatate (TESSALON) 200 MG capsule Take 1 capsule by mouth 3 times daily as needed for Cough, Disp-21 capsule, R-0Normal      ondansetron (ZOFRAN ODT) 4 MG disintegrating tablet Take 1 tablet by mouth every 8 hours as needed for Nausea or Vomiting, Disp-15 tablet, R-0Normal           Discharge Medication List as of 3/1/2020  1:45 PM          RONIT Pressley - RONIT Guzmán CNP  03/01/20 1547

## 2020-03-02 ENCOUNTER — TELEPHONE (OUTPATIENT)
Dept: FAMILY MEDICINE CLINIC | Age: 40
End: 2020-03-02

## 2020-06-05 ENCOUNTER — OFFICE VISIT (OUTPATIENT)
Dept: FAMILY MEDICINE CLINIC | Age: 40
End: 2020-06-05
Payer: COMMERCIAL

## 2020-06-05 VITALS
DIASTOLIC BLOOD PRESSURE: 76 MMHG | WEIGHT: 192.4 LBS | BODY MASS INDEX: 33.03 KG/M2 | SYSTOLIC BLOOD PRESSURE: 132 MMHG | RESPIRATION RATE: 16 BRPM | HEART RATE: 80 BPM | TEMPERATURE: 98.4 F

## 2020-06-05 PROCEDURE — 99213 OFFICE O/P EST LOW 20 MIN: CPT | Performed by: NURSE PRACTITIONER

## 2020-06-05 RX ORDER — ONDANSETRON 4 MG/1
4 TABLET, FILM COATED ORAL EVERY 6 HOURS PRN
Qty: 60 TABLET | Refills: 1 | Status: SHIPPED | OUTPATIENT
Start: 2020-06-05 | End: 2021-02-20

## 2020-06-05 RX ORDER — KETOROLAC TROMETHAMINE 10 MG/1
10 TABLET, FILM COATED ORAL EVERY 6 HOURS PRN
Qty: 20 TABLET | Refills: 0 | Status: SHIPPED | OUTPATIENT
Start: 2020-06-05 | End: 2021-02-20 | Stop reason: ALTCHOICE

## 2020-06-05 ASSESSMENT — PATIENT HEALTH QUESTIONNAIRE - PHQ9
SUM OF ALL RESPONSES TO PHQ QUESTIONS 1-9: 2
2. FEELING DOWN, DEPRESSED OR HOPELESS: 1
SUM OF ALL RESPONSES TO PHQ9 QUESTIONS 1 & 2: 2
1. LITTLE INTEREST OR PLEASURE IN DOING THINGS: 1
SUM OF ALL RESPONSES TO PHQ QUESTIONS 1-9: 2

## 2020-06-05 NOTE — PROGRESS NOTES
Visit Information    Have you changed or started any medications since your last visit including any over-the-counter medicines, vitamins, or herbal medicines? no   Are you having any side effects from any of your medications? -  no  Have you stopped taking any of your medications? Is so, why? -  no    Have you seen any other physician or provider since your last visit? No  Have you had any other diagnostic tests since your last visit? No  Have you been seen in the emergency room and/or had an admission to a hospital since we last saw you? No  Have you had your routine dental cleaning in the past 6 months? no    Have you activated your [employer] account? If not, what are your barriers?  Yes     Patient Care Team:  Melina Fairchild MD as PCP - Janae Alegria MD as PCP - [facility] Provider  Constance Harper MD as Obstetrician (Obstetrics & Gynecology)    Medical History Review  Past Medical, Family, and Social History reviewed and does contribute to the patient presenting condition    Health Maintenance   Topic Date Due    DTaP/Tdap/Td vaccine (1 - Tdap) 06/07/1997    Lipid screen  06/07/2018    Diabetes screen  06/07/2018    Cervical cancer screen  06/24/2018    Flu vaccine (Season Ended) 09/01/2020    HIV screen  Completed    Hepatitis A vaccine  Aged Out    Hepatitis B vaccine  Aged Out    Hib vaccine  Aged Out    Meningococcal (ACWY) vaccine  Aged Out    Pneumococcal 0-64 years Vaccine  Aged Out

## 2020-06-05 NOTE — LETTER
[address] [address]  Phone: [phone]  Fax: [address] [facility], APRN - CNP        June 5, 2020     Patient: Eusebia Faye   YOB: 1980   Date of Visit: 6/5/2020       To Whom it May Concern:    Ct Hayden was seen in my clinic on 6/5/2020. It is in my opinion that she remain out of work starting today through 6/21/20 with a return to work date of 6/22/20 due to medical condition requiring time off. If you have any questions or concerns, please don't hesitate to call.     Sincerely,         Renuka Lim, RONIT - CNP

## 2020-06-08 ASSESSMENT — ENCOUNTER SYMPTOMS
NAUSEA: 1
ABDOMINAL PAIN: 0
SHORTNESS OF BREATH: 0
COUGH: 0
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
## 2020-08-01 ENCOUNTER — NURSE TRIAGE (OUTPATIENT)
Dept: OTHER | Facility: CLINIC | Age: 40
End: 2020-08-01

## 2020-08-01 NOTE — TELEPHONE ENCOUNTER
Reason for Disposition   [1] COVID-19 infection suspected by caller or triager AND [2] mild symptoms (cough, fever, or others) AND [8] no complications or SOB    Answer Assessment - Initial Assessment Questions  1. COVID-19 DIAGNOSIS: \"Who made your Coronavirus (COVID-19) diagnosis? \" \"Was it confirmed by a positive lab test?\" If not diagnosed by a HCP, ask \"Are there lots of cases (community spread) where you live? \" (See [facility] health department website, if unsure)      Cristina davenport  2. ONSET: \"When did the COVID-19 symptoms start? \"       7/31/2020  3. WORST SYMPTOM: \"What is your worst symptom? \" (e.g., cough, fever, shortness of breath, muscle aches)      Headache  4. COUGH: \"Do you have a cough? \" If so, ask: \"How bad is the cough? \"        no  5. FEVER: \"Do you have a fever? \" If so, ask: \"What is your temperature, how was it measured, and when did it start? \"      no  6. RESPIRATORY STATUS: \"Describe your breathing? \" (e.g., shortness of breath, wheezing, unable to speak)       no  7. BETTER-SAME-WORSE: Rita Díaz you getting better, staying the same or getting worse compared to yesterday? \"  If getting worse, ask, \"In what way? \"      better  8. HIGH RISK DISEASE: \"Do you have any chronic medical problems? \" (e.g., asthma, heart or lung disease, weak immune system, etc.)      no  9. PREGNANCY: \"Is there any chance you are pregnant? \" \"When was your last menstrual period? \"      Tubes are tied 7/6/2020  10. OTHER SYMPTOMS: \"Do you have any other symptoms? \"  (e.g., chills, fatigue, headache, loss of smell or taste, muscle pain, sore throat)        Headache and diarrhea. Protocols used: CORONAVIRUS (XMGRW-[id number]) DIAGNOSED OR SUSPECTED-ADULT-    Caller was exposed to someone at work who was positive on 7/27/2020 and started s/s 7/31/2020 with headache and diarrhea. Caller is afebrile, no SOB or chest pain. Recommendation Call PCP when office is open.

## 2020-10-05 ENCOUNTER — OFFICE VISIT (OUTPATIENT)
Dept: FAMILY MEDICINE CLINIC | Age: 40
End: 2020-10-05
Payer: COMMERCIAL

## 2020-10-05 VITALS
SYSTOLIC BLOOD PRESSURE: 118 MMHG | HEART RATE: 80 BPM | TEMPERATURE: 97.5 F | WEIGHT: 193.8 LBS | DIASTOLIC BLOOD PRESSURE: 78 MMHG | RESPIRATION RATE: 14 BRPM | BODY MASS INDEX: 33.09 KG/M2 | HEIGHT: 64 IN | OXYGEN SATURATION: 98 %

## 2020-10-05 PROCEDURE — 99396 PREV VISIT EST AGE 40-64: CPT | Performed by: FAMILY MEDICINE

## 2020-10-05 SDOH — ECONOMIC STABILITY: INCOME INSECURITY: HOW HARD IS IT FOR YOU TO PAY FOR THE VERY BASICS LIKE FOOD, HOUSING, MEDICAL CARE, AND HEATING?: NOT HARD AT ALL

## 2020-10-05 SDOH — ECONOMIC STABILITY: FOOD INSECURITY: WITHIN THE PAST 12 MONTHS, THE FOOD YOU BOUGHT JUST DIDN'T LAST AND YOU DIDN'T HAVE MONEY TO GET MORE.: NEVER TRUE

## 2020-10-05 SDOH — ECONOMIC STABILITY: FOOD INSECURITY: WITHIN THE PAST 12 MONTHS, YOU WORRIED THAT YOUR FOOD WOULD RUN OUT BEFORE YOU GOT MONEY TO BUY MORE.: NEVER TRUE

## 2020-10-05 SDOH — ECONOMIC STABILITY: TRANSPORTATION INSECURITY
IN THE PAST 12 MONTHS, HAS LACK OF TRANSPORTATION KEPT YOU FROM MEETINGS, WORK, OR FROM GETTING THINGS NEEDED FOR DAILY LIVING?: NO

## 2020-10-05 SDOH — ECONOMIC STABILITY: TRANSPORTATION INSECURITY
IN THE PAST 12 MONTHS, HAS THE LACK OF TRANSPORTATION KEPT YOU FROM MEDICAL APPOINTMENTS OR FROM GETTING MEDICATIONS?: NO

## 2020-10-05 ASSESSMENT — ENCOUNTER SYMPTOMS
SHORTNESS OF BREATH: 0
GASTROINTESTINAL NEGATIVE: 1
WHEEZING: 0
COUGH: 1
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
## 2020-10-05 NOTE — PROGRESS NOTES
Visit Information    Have you changed or started any medications since your last visit including any over-the-counter medicines, vitamins, or herbal medicines? no   Are you having any side effects from any of your medications? -  no  Have you stopped taking any of your medications? Is so, why? -  yes -     Have you seen any other physician or provider since your last visit? No  Have you had any other diagnostic tests since your last visit? No  Have you been seen in the emergency room and/or had an admission to a hospital since we last saw you? No  Have you had your routine dental cleaning in the past 6 months? no    Have you activated your [employer] account? If not, what are your barriers?  Yes     Patient Care Team:  Tiffany Brito MD as PCP - Raúl Tong MD as PCP - [facility]  Sangeeta Cruz MD as Obstetrician (Obstetrics & Gynecology)    Medical History Review  Past Medical, Family, and Social History reviewed and does contribute to the patient presenting condition    Health Maintenance   Topic Date Due    DTaP/Tdap/Td vaccine (1 - Tdap) 06/07/1997    Lipid screen  06/07/2018    Diabetes screen  06/07/2018    Cervical cancer screen  06/24/2018    Flu vaccine (1) 09/01/2020    HIV screen  Completed    Hepatitis A vaccine  Aged Out    Hepatitis B vaccine  Aged Out    Hib vaccine  Aged Out    Meningococcal (ACWY) vaccine  Aged Out    Pneumococcal 0-64 years Vaccine  Aged Out

## 2020-10-05 NOTE — PATIENT INSTRUCTIONS
You may receive a survey regarding the care you received during your visit. Your input is valuable to us. We encourage you to complete and return your survey. We hope you will choose us in the future for your healthcare needs. Continue present food supplement. Use Zyrtec 10 mg tab daily for allergies.

## 2020-10-06 LAB
CHOLESTEROL/HDL RATIO: 3.4 (ref 1–5)
CHOLESTEROL: 207 MG/DL (ref 150–200)
GLUCOSE: 94 MG/DL (ref 65–99)
HDLC SERPL-MCNC: 61 MG/DL
LDL CHOLESTEROL CALCULATED: 123 MG/DL
LDL/HDL RATIO: 2
TRIGL SERPL-MCNC: 117 MG/DL (ref 27–150)
VLDLC SERPL CALC-MCNC: 23 MG/DL (ref 0–30)

## 2020-10-06 NOTE — RESULT ENCOUNTER NOTE
Cholesterol is 207 with normal triglyceride HDL and LDL levels. Her fasting blood sugar is good at 94. Last year was 119 so she has brought that down.

## 2020-10-14 ENCOUNTER — HOSPITAL ENCOUNTER (EMERGENCY)
Age: 40
Discharge: HOME | End: 2020-10-14
Attending: EMERGENCY MEDICINE
Payer: COMMERCIAL

## 2020-10-14 VITALS
OXYGEN SATURATION: 99 % | TEMPERATURE: 98.4 F | RESPIRATION RATE: 16 BRPM | SYSTOLIC BLOOD PRESSURE: 143 MMHG | WEIGHT: 193 LBS | HEART RATE: 91 BPM | BODY MASS INDEX: 33.13 KG/M2 | DIASTOLIC BLOOD PRESSURE: 89 MMHG

## 2020-10-14 LAB
BILIRUBIN URINE: NEGATIVE
BLOOD, URINE: ABNORMAL
CHARACTER, URINE: ABNORMAL
COLOR: YELLOW
GLUCOSE URINE: NEGATIVE MG/DL
KETONES, URINE: NEGATIVE
LEUKOCYTE ESTERASE, URINE: ABNORMAL
NITRITE, URINE: POSITIVE
PH UA: 6 (ref 5–9)
PROTEIN UA: 100 MG/DL
SPECIFIC GRAVITY UA: 1.02 (ref 1–1.03)
UROBILINOGEN, URINE: 0.2 EU/DL (ref 0.2–1)

## 2020-10-14 PROCEDURE — 99213 OFFICE O/P EST LOW 20 MIN: CPT | Performed by: EMERGENCY MEDICINE

## 2020-10-14 PROCEDURE — 81003 URINALYSIS AUTO W/O SCOPE: CPT

## 2020-10-14 PROCEDURE — 99213 OFFICE O/P EST LOW 20 MIN: CPT

## 2020-10-14 RX ORDER — PHENAZOPYRIDINE HYDROCHLORIDE 200 MG/1
200 TABLET, FILM COATED ORAL 3 TIMES DAILY PRN
Qty: 9 TABLET | Refills: 0 | Status: SHIPPED | OUTPATIENT
Start: 2020-10-14 | End: 2021-02-20

## 2020-10-14 RX ORDER — CIPROFLOXACIN 500 MG/1
500 TABLET, FILM COATED ORAL 2 TIMES DAILY
Qty: 10 TABLET | Refills: 0 | Status: SHIPPED | OUTPATIENT
Start: 2020-10-14 | End: 2020-10-19

## 2020-10-14 ASSESSMENT — ENCOUNTER SYMPTOMS
VOMITING: 0
DIARRHEA: 0
ABDOMINAL PAIN: 1
VOICE CHANGE: 0
EYE DISCHARGE: 0
STRIDOR: 0
CHOKING: 0
EYE REDNESS: 0
EYE PAIN: 0
BLOOD IN STOOL: 0
FACIAL SWELLING: 0
BACK PAIN: 0
CONSTIPATION: 0
SINUS PRESSURE: 0
COUGH: 0
TROUBLE SWALLOWING: 0
SORE THROAT: 0
WHEEZING: 0
SHORTNESS OF BREATH: 0
NAUSEA: 0

## 2020-10-14 ASSESSMENT — PAIN DESCRIPTION - LOCATION: LOCATION: PELVIS

## 2020-10-14 ASSESSMENT — PAIN SCALES - GENERAL: PAINLEVEL_OUTOF10: 7

## 2020-10-14 ASSESSMENT — PAIN DESCRIPTION - PAIN TYPE: TYPE: ACUTE PAIN

## 2020-10-14 ASSESSMENT — PAIN DESCRIPTION - FREQUENCY: FREQUENCY: INTERMITTENT

## 2020-10-14 ASSESSMENT — PAIN DESCRIPTION - DESCRIPTORS: DESCRIPTORS: ACHING

## 2020-10-14 ASSESSMENT — PAIN DESCRIPTION - ORIENTATION: ORIENTATION: LEFT

## 2020-10-14 NOTE — ED PROVIDER NOTES
[facility] Encounter      CHIEFCOMPLAINT       Chief Complaint   Patient presents with    Urinary Tract Infection       Nurses Notes reviewed and I agree except as noted in the HPI. HISTORY OF PRESENT ILLNESS   Radha Larson is a 41 y.o. female who presents with 1 week history of dysuria, urinary frequency, urgency, hematuria. Patient states symptoms are identical to previous UTI. She has suprapubic pressure that she rates at 7 out of 10 in severity. No back pain, fever, vomiting, vaginal discharge or possibility of pregnancy. No history of diabetes, urosepsis, pyelonephritis. Non-smoker. GFR normal  REVIEW OF SYSTEMS     Review of Systems   Constitutional: Negative for appetite change, chills, fatigue, fever and unexpected weight change. HENT: Negative for congestion, ear discharge, ear pain, facial swelling, hearing loss, nosebleeds, postnasal drip, sinus pressure, sore throat, trouble swallowing and voice change. Eyes: Negative for pain, discharge, redness and visual disturbance. Respiratory: Negative for cough, choking, shortness of breath, wheezing and stridor. Cardiovascular: Negative for chest pain and leg swelling. Gastrointestinal: Positive for abdominal pain. Negative for blood in stool, constipation, diarrhea, nausea and vomiting. Genitourinary: Positive for dysuria, frequency, hematuria and urgency. Negative for flank pain, vaginal bleeding and vaginal discharge. Musculoskeletal: Negative for arthralgias, back pain, neck pain and neck stiffness. Skin: Negative for rash. Neurological: Negative for dizziness, seizures, syncope, weakness, light-headedness and headaches. Hematological: Negative for adenopathy. Does not bruise/bleed easily. Psychiatric/Behavioral: Negative for confusion, sleep disturbance and suicidal ideas. The patient is not nervous/anxious. All other systems reviewed and are negative.       PAST MEDICAL HISTORY reviewed. Constitutional:       General: She is not in acute distress. Appearance: She is well-developed. She is not ill-appearing. Comments: Moist membranes, normal airway   HENT:      Head: Normocephalic and atraumatic. Right Ear: Tympanic membrane and external ear normal.      Left Ear: Tympanic membrane and external ear normal.      Nose: Nose normal.      Right Sinus: No maxillary sinus tenderness or frontal sinus tenderness. Left Sinus: No maxillary sinus tenderness or frontal sinus tenderness. Mouth/Throat:      Pharynx: No oropharyngeal exudate. Comments: Oropharynx normal  Eyes:      General: No scleral icterus. Right eye: No discharge. Left eye: No discharge. Extraocular Movements:      Right eye: Normal extraocular motion. Left eye: Normal extraocular motion. Conjunctiva/sclera: Conjunctivae normal.      Pupils: Pupils are equal, round, and reactive to light. Comments: Conjunctiva clear   Neck:      Musculoskeletal: Normal range of motion. Thyroid: No thyromegaly. Vascular: No JVD. Comments: No Meningismus  Cardiovascular:      Rate and Rhythm: Normal rate and regular rhythm. Pulses: Normal pulses. Heart sounds: Normal heart sounds, S1 normal and S2 normal. No murmur. No friction rub. No gallop. Pulmonary:      Effort: Pulmonary effort is normal. No respiratory distress. Breath sounds: Normal breath sounds. No stridor. No wheezing or rales. Comments: No cough, lungs clear  Chest:      Chest wall: No tenderness. Abdominal:      General: Bowel sounds are normal. There is no distension. Palpations: Abdomen is soft. There is no mass. Tenderness: There is abdominal tenderness in the suprapubic area. There is no right CVA tenderness, guarding or rebound. Genitourinary:     Comments: Deferred  Musculoskeletal: Normal range of motion. General: No tenderness.       Right lower leg: Normal. No Renal colic. Patient has acute cystitis. Will treat with Cipro, Pyridium, Tylenol, increased oral clear liquids, rest.  Patient to follow-up with PCP in 5 days if problems persist, and she understands to go to ED if worse.   PAT[facility]:  [facility] [address]    Schedule an appointment as soon as possible for a visit in 5 days  Recheck if problems persist, go to emergency if worse    DISCHARGE MEDICATIONS:  Discharge Medication List as of 10/14/2020  9:36 AM      START taking these medications    Details   phenazopyridine (PYRIDIUM) 200 MG tablet Take 1 tablet by mouth 3 times daily as needed for Pain (dysuria), Disp-9 tablet,R-0Print      ciprofloxacin (CIPRO) 500 MG tablet Take 1 tablet by mouth 2 times daily for 10 doses, Disp-10 tablet,R-0Print           Discharge Medication List as of 10/14/2020  9:36 AM          MD Stanley Delgado MD  10/14/20 Rashi Mcgovern MD  10/14/20 [id number]

## 2020-10-14 NOTE — ED TRIAGE NOTES
Patient states symptoms started Oct. 7, 2020, frequency, pain, dysuria, with urination, blood clot noted in urine sample. No Pain med taken.

## 2020-10-14 NOTE — LETTER
[facility]  [address] [place] [id number]  Phone: [phone]               October 14, 2020    Patient: German Donovan   YOB: 1980   Date of Visit: 10/14/2020       To Whom It May Concern:    Mary Plascencia was seen and treated in our emergency department on 10/14/2020. She may return to work on 10/16/2020.   No work October 14 and October 15, 2020      Sincerely,       Gibson Iyer MD         Signature:__________________________________

## 2020-10-15 ENCOUNTER — TELEPHONE (OUTPATIENT)
Dept: FAMILY MEDICINE CLINIC | Age: 40
End: 2020-10-15

## 2020-12-29 ENCOUNTER — HOSPITAL ENCOUNTER (OUTPATIENT)
Age: 40
Setting detail: SPECIMEN
Discharge: HOME | End: 2020-12-29
Payer: COMMERCIAL

## 2020-12-30 LAB
DIRECT EXAM: ABNORMAL
Lab: ABNORMAL
SOURCE: NORMAL
SPECIMEN DESCRIPTION: ABNORMAL
TRICHOMONAS VAGINALI, MOLECULAR: NEGATIVE

## 2020-12-31 LAB
C. TRACHOMATIS DNA ,URINE: NEGATIVE
N. GONORRHOEAE DNA, URINE: NEGATIVE
SPECIMEN DESCRIPTION: NORMAL

## 2021-02-03 ENCOUNTER — HOSPITAL ENCOUNTER (OUTPATIENT)
Age: 41
Setting detail: SPECIMEN
Discharge: HOME | End: 2021-02-03
Payer: COMMERCIAL

## 2021-02-04 LAB
C. TRACHOMATIS DNA ,URINE: NEGATIVE
DIRECT EXAM: ABNORMAL
Lab: ABNORMAL
N. GONORRHOEAE DNA, URINE: NEGATIVE
SOURCE: NORMAL
SPECIMEN DESCRIPTION: ABNORMAL
SPECIMEN DESCRIPTION: NORMAL
TRICHOMONAS VAGINALI, MOLECULAR: NEGATIVE

## 2021-02-20 ENCOUNTER — HOSPITAL ENCOUNTER (EMERGENCY)
Age: 41
Discharge: HOME | End: 2021-02-20
Attending: EMERGENCY MEDICINE
Payer: COMMERCIAL

## 2021-02-20 VITALS
BODY MASS INDEX: 34.16 KG/M2 | SYSTOLIC BLOOD PRESSURE: 139 MMHG | TEMPERATURE: 97.7 F | DIASTOLIC BLOOD PRESSURE: 88 MMHG | RESPIRATION RATE: 16 BRPM | WEIGHT: 199 LBS | OXYGEN SATURATION: 96 % | HEART RATE: 89 BPM

## 2021-02-20 DIAGNOSIS — H10.33 ACUTE CONJUNCTIVITIS OF BOTH EYES, UNSPECIFIED ACUTE CONJUNCTIVITIS TYPE: Primary | ICD-10-CM

## 2021-02-20 PROCEDURE — 99213 OFFICE O/P EST LOW 20 MIN: CPT | Performed by: EMERGENCY MEDICINE

## 2021-02-20 PROCEDURE — 99213 OFFICE O/P EST LOW 20 MIN: CPT

## 2021-02-20 RX ORDER — GENTAMICIN SULFATE 3 MG/ML
2 SOLUTION/ DROPS OPHTHALMIC 4 TIMES DAILY
Qty: 1 BOTTLE | Refills: 0 | Status: SHIPPED | OUTPATIENT
Start: 2021-02-20 | End: 2021-02-27

## 2021-02-20 RX ORDER — IBUPROFEN 600 MG/1
600 TABLET ORAL 3 TIMES DAILY PRN
Qty: 20 TABLET | Refills: 0 | Status: SHIPPED | OUTPATIENT
Start: 2021-02-20 | End: 2021-05-11

## 2021-02-20 ASSESSMENT — ENCOUNTER SYMPTOMS
WHEEZING: 0
ABDOMINAL PAIN: 0
COUGH: 0
EYE REDNESS: 1
EYE ITCHING: 1
SINUS PRESSURE: 0
SORE THROAT: 0
EYE PAIN: 1
EYE DISCHARGE: 1
CONSTIPATION: 0
BLOOD IN STOOL: 0
VOMITING: 0
CHOKING: 0
STRIDOR: 0
SHORTNESS OF BREATH: 0
TROUBLE SWALLOWING: 0
VOICE CHANGE: 0
FACIAL SWELLING: 0
BACK PAIN: 0
DIARRHEA: 0
PHOTOPHOBIA: 0
NAUSEA: 0

## 2021-02-20 NOTE — LETTER
[facility]  [address] [place] [id number]  Phone: [phone]               February 20, 2021    Patient: Roberto Carlos Regan   YOB: 1980   Date of Visit: 2/20/2021       To Whom It May Concern:    Nancy Blackman was seen and treated in our emergency department on 2/20/2021. She may return to work on February 23, 2021.   No work February 20 to February 22, 2021      Sincerely,       Varghese Jimenez MD         Signature:__________________________________

## 2021-02-21 NOTE — ED PROVIDER NOTES
[phone]     No chief complaint on file. Nurses Notes reviewed and I agree except as noted in the HPI. HISTORY OF PRESENT ILLNESS   Nellie Edgar is a 41 y.o. female who presents with 24-hour history of bilateral eye redness, irritation, purulent discharge. Has some pruritus. She denies photophobia, visual loss, contact lens use, eye trauma. She denies eye pain at this time. No exposure to pinkeye. No fever, headache, neck pain, vomiting, upper respiratory symptoms or  symptoms. Non-smoker. No history of glaucoma or cataracts  REVIEW OF SYSTEMS     Review of Systems   Constitutional: Negative for appetite change, chills, fatigue, fever and unexpected weight change. HENT: Negative for congestion, ear discharge, ear pain, facial swelling, hearing loss, nosebleeds, postnasal drip, sinus pressure, sore throat, trouble swallowing and voice change. Eyes: Positive for pain, discharge, redness and itching. Negative for photophobia and visual disturbance. Respiratory: Negative for cough, choking, shortness of breath, wheezing and stridor. Cardiovascular: Negative for chest pain and leg swelling. Gastrointestinal: Negative for abdominal pain, blood in stool, constipation, diarrhea, nausea and vomiting. Genitourinary: Negative for dysuria, flank pain, frequency, hematuria, urgency, vaginal bleeding and vaginal discharge. Musculoskeletal: Negative for arthralgias, back pain, neck pain and neck stiffness. Skin: Negative for rash. Neurological: Negative for dizziness, seizures, syncope, weakness, light-headedness and headaches. Hematological: Negative for adenopathy. Does not bruise/bleed easily. Psychiatric/Behavioral: Negative for confusion, sleep disturbance and suicidal ideas. The patient is not nervous/anxious. All other systems reviewed and are negative.       PAST MEDICAL HISTORY         Diagnosis Date    Concussion     Conversion disorder with weakness or paralysis, acute episode     Conversion disorder with weakness or paralysis, acute episode     Expressive aphasia     Pneumonia        SURGICAL HISTORY     Patient  has a past surgical history that includes Tubal ligation. CURRENT MEDICATIONS       Discharge Medication List as of 2/20/2021  7:47 PM      CONTINUE these medications which have NOT CHANGED    Details   OLIVE LEAF PO Take by mouthHistorical Med      Multiple Vitamins-Minerals (MULTIVITAMIN ADULT EXTRA C PO) Take by mouthHistorical Med      acetaminophen (TYLENOL) 500 MG tablet Take 1,000 mg by mouth every 6 hours as needed for PainHistorical Med             ALLERGIES     Patient is is allergic to adhesive tape. FAMILY HISTORY     Patient'sfamily history includes Cancer in her father, maternal grandfather, maternal grandmother, paternal grandfather, and paternal grandmother; Depression in her maternal grandfather; Early Death in an other family member; Heart Disease in an other family member; High Blood Pressure in her father, mother, and sister; High Cholesterol in her mother; Kidney Disease in her father; Other in her sister. SOCIAL HISTORY     Patient  reports that she has never smoked. She has never used smokeless tobacco. She reports current alcohol use. She reports that she does not use drugs. PHYSICAL EXAM     ED TRIAGE VITALS  BP: 139/88, Temp: 97.7 °F (36.5 °C), Pulse: 89, Resp: 16, SpO2: 96 %  Physical Exam  Vitals signs and nursing note reviewed. Constitutional:       General: She is not in acute distress. Appearance: She is well-developed. She is not ill-appearing. Comments: Moist membranes, normal airway   HENT:      Head: Normocephalic and atraumatic. Right Ear: Tympanic membrane and external ear normal.      Left Ear: Tympanic membrane and external ear normal.      Nose: Nose normal. No congestion or rhinorrhea.       Right Sinus: No maxillary sinus tenderness or frontal sinus tenderness. Left Sinus: No maxillary sinus tenderness or frontal sinus tenderness. Mouth/Throat:      Pharynx: No oropharyngeal exudate. Comments: Oropharynx normal  Eyes:      General: No scleral icterus. Right eye: No discharge. Left eye: No discharge. Extraocular Movements:      Right eye: Normal extraocular motion. Left eye: Normal extraocular motion. Conjunctiva/sclera: Conjunctivae normal.      Pupils: Pupils are equal, round, and reactive to light. Comments: Bilateral diffuse conjunctival erythema with some purulence in the medial canthi. Orbital tissues normal.  Magnified examination of cornea and anterior chamber clear. No photophobia   Neck:      Musculoskeletal: Normal range of motion. Thyroid: No thyromegaly. Vascular: No JVD. Comments: No meningismus  Cardiovascular:      Rate and Rhythm: Normal rate and regular rhythm. Pulses: Normal pulses. Heart sounds: Normal heart sounds, S1 normal and S2 normal. No murmur. No friction rub. No gallop. Pulmonary:      Effort: Pulmonary effort is normal. No tachypnea or respiratory distress. Breath sounds: Normal breath sounds. No stridor. No decreased breath sounds, wheezing, rhonchi or rales. Comments: No cough, lungs clear  Chest:      Chest wall: No tenderness. Abdominal:      General: Bowel sounds are normal. There is no distension. Palpations: Abdomen is soft. There is no mass. Tenderness: There is no abdominal tenderness. There is no right CVA tenderness, left CVA tenderness, guarding or rebound. Comments: Soft nontender   Musculoskeletal: Normal range of motion. General: No tenderness. Right lower leg: Normal.      Left lower leg: Normal.   Lymphadenopathy:      Cervical: Cervical adenopathy present. Right cervical: Superficial cervical adenopathy present. No deep cervical adenopathy.      Left cervical: Superficial cervical adenopathy present. No deep cervical adenopathy. Skin:     General: Skin is warm and dry. Findings: No erythema or rash. Comments: No rash or bruising on examination   Neurological:      Mental Status: She is alert and oriented to person, place, and time. Cranial Nerves: No cranial nerve deficit. Motor: No abnormal muscle tone. Coordination: Coordination normal.      Deep Tendon Reflexes: Reflexes are normal and symmetric. Reflexes normal.      Comments:  appropriate, no focal finding   Psychiatric:         Behavior: Behavior normal.         Thought Content: Thought content normal.         Judgment: Judgment normal.         DIAGNOSTIC RESULTS   Labs: No results found for this visit on 02/20/21. IMAGING:  No orders to display     URGENT CARE COURSE:     Vitals:    02/20/21 1930   BP: 139/88   Pulse: 89   Resp: 16   Temp: 97.7 °F (36.5 °C)   TempSrc: Temporal   SpO2: 96%   Weight: 199 lb (90.3 kg)       Medications - No data to display  PROCEDURES:  None  FINALIMPRESSION      1. Acute conjunctivitis of both eyes, unspecified acute conjunctivitis type        DISPOSITION/PLAN   DISPOSITION Decision To Discharge 02/20/2021 07:38:08 PM  Nontoxic, well-hydrated, normal airway. Patient has bilateral conjunctivitis. No periorbital cellulitis or deep structure eye infection. No eye trauma or dendritic lesions. No increased intraocular pressure. Will treat with Garamycin drops, Motrin, increased oral clear liquids, rest including eye rest.  Patient to follow-up at [address] [place] [address] in 5 days if problems persist, and understands to go to ED if worse. PATIENT REFERRED TO:  [address] [place] [address],[address]  [address] [place] [address]  In 5 days  reCheck if problems persist, go to emergency if worse    DISCHARGE MEDICATIONS:  Discharge Medication List as of 2/20/2021  7:47 PM      START taking these medications Details   gentamicin (GARAMYCIN) 0.3 % ophthalmic solution Place 2 drops into both eyes 4 times daily for 7 days, Disp-1 Bottle, R-0Print      ibuprofen (IBU) 600 MG tablet Take 1 tablet by mouth 3 times daily as needed for Pain (Take with food 3 times daily for pain), Disp-20 tablet, R-0Print           Discharge Medication List as of 2/20/2021  7:47 PM          MD Jose Abebe MD  02/20/21 2743

## 2021-02-22 ENCOUNTER — TELEPHONE (OUTPATIENT)
Dept: FAMILY MEDICINE CLINIC | Age: 41
End: 2021-02-22

## 2021-04-01 ENCOUNTER — OFFICE VISIT (OUTPATIENT)
Dept: FAMILY MEDICINE CLINIC | Age: 41
End: 2021-04-01
Payer: COMMERCIAL

## 2021-04-01 VITALS
HEART RATE: 96 BPM | SYSTOLIC BLOOD PRESSURE: 128 MMHG | BODY MASS INDEX: 35.7 KG/M2 | DIASTOLIC BLOOD PRESSURE: 60 MMHG | TEMPERATURE: 98.2 F | RESPIRATION RATE: 16 BRPM | WEIGHT: 208 LBS

## 2021-04-01 DIAGNOSIS — Z00.00 WELL ADULT EXAM: Primary | ICD-10-CM

## 2021-04-01 DIAGNOSIS — F43.0 ACUTE STRESS DISORDER: ICD-10-CM

## 2021-04-01 DIAGNOSIS — F43.12 CHRONIC POST-TRAUMATIC STRESS DISORDER (PTSD): ICD-10-CM

## 2021-04-01 PROCEDURE — 99396 PREV VISIT EST AGE 40-64: CPT | Performed by: NURSE PRACTITIONER

## 2021-04-01 ASSESSMENT — ENCOUNTER SYMPTOMS
COUGH: 0
NAUSEA: 0
SHORTNESS OF BREATH: 0
ABDOMINAL PAIN: 0

## 2021-04-01 ASSESSMENT — PATIENT HEALTH QUESTIONNAIRE - PHQ9
SUM OF ALL RESPONSES TO PHQ9 QUESTIONS 1 & 2: 1
SUM OF ALL RESPONSES TO PHQ QUESTIONS 1-9: 1
1. LITTLE INTEREST OR PLEASURE IN DOING THINGS: 0

## 2021-04-01 NOTE — PATIENT INSTRUCTIONS
You may receive a survey regarding the care you received during your visit. Your input is valuable to us. We encourage you to complete and return your survey. We hope you will choose us in the future for your healthcare needs. Patient Education        Learning About Low-Carbohydrate Foods  What foods are low in carbohydrate? The foods you eat contain nutrients, such as vitamins and minerals. Carbohydrate is a nutrient. Your body needs the right amount to stay healthy and work as it should. You can use the list below to help you make choices about which foods to eat. Some foods that are lower in carbohydrate include:  Dairy and dairy alternatives  · Cheese  · Cottage cheese  · Cream cheese  · Nut milk (unsweetened)  · Soy milk (unsweetened)  · Yogurt (Greek, plain)  Fruits  · Avocado  · [employer] and other protein foods  · Almonds  · Beef  · Chicken  · Cod  · Eggs  · Halibut  · Peanut butter and other nut butters  · Pistachios  · Pork  · Pumpkin seeds  · Tofu  · Trout  · [facility]  · [place] [facility]  · Walnuts  Vegetables  · Broccoli  · Carrots  · Cauliflower  · Green beans  · Mushrooms  · Peppers  · Salad greens  · Spinach  · Tomatoes  Work with your doctor to find out how much of this nutrient you need. Depending on your health, you may need more or less of it in your diet. Where can you learn more? Go to https://Therapeutic Systemspepiceweb.healthMeetingmix.com. org and sign in to your [employer] account. Enter [phone] in the [facility] box to learn more about \"Learning About Low-Carbohydrate Foods. \"     If you do not have an account, please click on the \"Sign Up Now\" link. Current as of: December 17, 2020               Content Version: 12.8  © 9999-3088 Healthwise, Incorporated. Care instructions adapted under license by [facility] ([facility]).  If you have questions about a medical condition or this instruction, always ask your healthcare professional. [address] any warranty or liability for your use of this information.

## 2021-04-01 NOTE — PROGRESS NOTES
Visit Information    Have you changed or started any medications since your last visit including any over-the-counter medicines, vitamins, or herbal medicines? yes - see list   Are you having any side effects from any of your medications? -  no  Have you stopped taking any of your medications? Is so, why? -  no    Have you seen any other physician or provider since your last visit? No  Have you had any other diagnostic tests since your last visit? No  Have you been seen in the emergency room and/or had an admission to a hospital since we last saw you? No  Have you had your routine dental cleaning in the past 6 months? no    Have you activated your [employer] account? If not, what are your barriers?  Yes     Patient Care Team:  RONIT Kinsey CNP as PCP - General (Family Nurse Practitioner)  RONIT Kinsey CNP as PCP - [facility]  Marc Guzmán MD as Obstetrician (Obstetrics & Gynecology)    Medical History Review  Past Medical, Family, and Social History reviewed and does not contribute to the patient presenting condition    Health Maintenance   Topic Date Due    Hepatitis C screen  Never done    COVID-19 Vaccine (1) Never done    DTaP/Tdap/Td vaccine (1 - Tdap) Never done    Cervical cancer screen  06/24/2018    Flu vaccine (Season Ended) 09/01/2021    Lipid screen  10/05/2025    HIV screen  Completed    Hepatitis A vaccine  Aged Out    Hepatitis B vaccine  Aged Out    Hib vaccine  Aged Out    Meningococcal (ACWY) vaccine  Aged Out    Pneumococcal 0-64 years Vaccine  Aged Out

## 2021-04-01 NOTE — PROGRESS NOTES
Subjective:      Patient ID: Doreen Jain 1980 is a 41 y.o. female here for evaluation. Chief Complaint   Patient presents with    Forms     [place]       Patient Active Problem List   Diagnosis    Hypokalemia    Cerebral concussion    Concussion    Conversion disorder with weakness or paralysis, acute episode    Expressive aphasia    TBI (traumatic brain injury) ([facility] [address]    Dysarthria    Dizziness    Anxiety    SVT (supraventricular tachycardia) ([facility] [address]       Denies CP, SOB or chest tightness. Denies joint pain. Her PTSD associated with the murder of her son 6 years ago is starting to flare again. She would like [facility] papers filled out so that she can have any points against her attendance at work. When the PTSD occurs, she finds it hard to be functional at day.      She is currently using no psychotropic medications and wants to keep it that way if at all possible. Wt Readings from Last 3 Encounters:   04/01/21 208 lb (94.3 kg)   02/20/21 199 lb (90.3 kg)   10/14/20 193 lb (87.5 kg)       Working 3rd shift. Appetite and diet all over the place. Weight gain. Snacking a lot at work      Vitals:    04/01/21 0946   BP: 128/60   Pulse: 96   Resp: 16   Temp: 98.2 °F (36.8 °C)      BP wnl    Labs reviewed.      No results found for: LABA1C  No results found for: EAG    No components found for: CHLPL  Lab Results   Component Value Date    TRIG 117 10/05/2020     Lab Results   Component Value Date    HDL 61 10/05/2020     Lab Results   Component Value Date    LDLCALC 123 10/05/2020     Lab Results   Component Value Date    LABVLDL 23 10/05/2020         Chemistry        Component Value Date/Time     11/05/2019 1340     10/31/2019 1900    K 3.6 11/05/2019 1340    K 3.8 10/31/2019 1900    K 3.8 10/31/2019 1900     10/31/2019 1900    CO2 20 (L) 10/31/2019 1900    BUN 21 10/31/2019 1900    CREATININE 0.8 10/31/2019 1900        Component Value Date/Time    CALCIUM 9.6 10/31/2019 1900 ALKPHOS 67 10/31/2019 1900    AST 31 10/31/2019 1900    ALT 26 10/31/2019 1900    BILITOT 0.8 10/31/2019 1900            Lab Results   Component Value Date    TSH 1.060 06/14/2018       Lab Results   Component Value Date    WBC 5.6 11/05/2019    HGB 12.1 11/05/2019    HCT 36.9 (L) 11/05/2019    MCV 94 11/05/2019     11/05/2019         Health Maintenance   Topic Date Due    Hepatitis C screen  Never done    COVID-19 Vaccine (1) Never done    DTaP/Tdap/Td vaccine (1 - Tdap) Never done    Cervical cancer screen  06/24/2018    Flu vaccine (Season Ended) 09/01/2021    Lipid screen  10/05/2025    HIV screen  Completed    Hepatitis A vaccine  Aged Out    Hepatitis B vaccine  Aged Out    Hib vaccine  Aged Out    Meningococcal (ACWY) vaccine  Aged Out    Pneumococcal 0-64 years Vaccine  Aged [employer] History   Administered Date(s) Administered    Influenza Virus Vaccine 10/01/2017, 10/30/2018    Pneumococcal Polysaccharide ([id number]) 08/24/2012       Review of Systems   Constitutional: Negative for chills and fever. HENT: Negative. Respiratory: Negative for cough and shortness of breath. Cardiovascular: Negative for chest pain. Gastrointestinal: Negative for abdominal pain and nausea. Skin: Negative for rash. Neurological: Negative for dizziness, light-headedness and headaches. Psychiatric/Behavioral: Negative. Objective:   Physical Exam  Constitutional:       General: She is not in acute distress. Eyes:      Pupils: Pupils are equal, round, and reactive to light. Neck:      Musculoskeletal: Normal range of motion and neck supple. Cardiovascular:      Rate and Rhythm: Normal rate and regular rhythm. Heart sounds: No murmur. Pulmonary:      Effort: Pulmonary effort is normal.      Breath sounds: Normal breath sounds. No wheezing. Abdominal:      General: Bowel sounds are normal. There is no distension. Palpations: Abdomen is soft.

## 2021-05-11 ENCOUNTER — HOSPITAL ENCOUNTER (EMERGENCY)
Age: 41
Discharge: HOME | End: 2021-05-11
Payer: COMMERCIAL

## 2021-05-11 VITALS
TEMPERATURE: 98 F | WEIGHT: 200 LBS | SYSTOLIC BLOOD PRESSURE: 140 MMHG | BODY MASS INDEX: 34.15 KG/M2 | DIASTOLIC BLOOD PRESSURE: 86 MMHG | HEART RATE: 97 BPM | RESPIRATION RATE: 16 BRPM | HEIGHT: 64 IN | OXYGEN SATURATION: 97 %

## 2021-05-11 DIAGNOSIS — Z91.09 HISTORY OF ENVIRONMENTAL ALLERGIES: ICD-10-CM

## 2021-05-11 DIAGNOSIS — J01.10 ACUTE NON-RECURRENT FRONTAL SINUSITIS: ICD-10-CM

## 2021-05-11 DIAGNOSIS — J06.9 VIRAL URI WITH COUGH: Primary | ICD-10-CM

## 2021-05-11 PROCEDURE — 99213 OFFICE O/P EST LOW 20 MIN: CPT

## 2021-05-11 PROCEDURE — 99213 OFFICE O/P EST LOW 20 MIN: CPT | Performed by: NURSE PRACTITIONER

## 2021-05-11 RX ORDER — PREDNISONE 20 MG/1
20 TABLET ORAL 2 TIMES DAILY
Qty: 10 TABLET | Refills: 0 | Status: SHIPPED | OUTPATIENT
Start: 2021-05-11 | End: 2021-05-16

## 2021-05-11 RX ORDER — AMOXICILLIN 875 MG/1
875 TABLET, COATED ORAL 2 TIMES DAILY
Qty: 20 TABLET | Refills: 0 | Status: SHIPPED | OUTPATIENT
Start: 2021-05-11 | End: 2021-05-21

## 2021-05-11 RX ORDER — FLUCONAZOLE 150 MG/1
150 TABLET ORAL ONCE
Qty: 1 TABLET | Refills: 0 | Status: SHIPPED | OUTPATIENT
Start: 2021-05-11 | End: 2021-05-11

## 2021-05-11 ASSESSMENT — ENCOUNTER SYMPTOMS
SHORTNESS OF BREATH: 0
SORE THROAT: 1
SINUS PRESSURE: 0
RHINORRHEA: 0
NAUSEA: 0
COUGH: 0
DIARRHEA: 0
BACK PAIN: 0
TROUBLE SWALLOWING: 0
VOMITING: 0

## 2021-05-11 ASSESSMENT — PAIN SCALES - GENERAL: PAINLEVEL_OUTOF10: 7

## 2021-05-11 ASSESSMENT — PAIN DESCRIPTION - LOCATION: LOCATION: EAR;THROAT

## 2021-05-11 NOTE — ED NOTES
Pt. Released in stable condition, ambulated per self to private car. Instructed pt to follow-up with family doctor as needed for recheck or go directly to the emergency department for any concerns/worsening conditions. Pt. Verbalized understanding of instructions. No questions at this time. RX in hand.       Ines Rodriguez RN  05/11/21 [id number]

## 2021-05-11 NOTE — ED PROVIDER NOTES
[facility]       CHIEF COMPLAINT       Chief Complaint   Patient presents with    Nasal Congestion       Nurses Notes reviewed and I agree except as noted in the HPI. HISTORY OF PRESENT ILLNESS   Lauryn Mckinney is a 41 y.o. female who presents the urgent care center complaining of sinus pressure maxillary pressure sore throat and ear pain that she rates 7 on 10 scale. Patient states that she does have a history of environmental allergies and has been taking Claritin. She stated that she has had no nausea vomiting or diarrhea. Patient at the present time sitting on table skin is warm and dry does not appear to be in any acute distress. He denies any vertigo or tinnitus. The history is provided by the patient.    Sinusitis  Pain details:     Quality:  Aching    Severity:  Moderate    Duration:  1 day    Timing:  Constant  Duration:  1 day  Progression:  Worsening  Chronicity:  New  Context: allergies    Relieved by:  None tried  Worsened by:  Nothing  Ineffective treatments:  None tried  Associated symptoms: congestion, ear pain and sore throat    Associated symptoms: no chest pain, no chills, no cough, no fatigue, no fever, no headaches, no nausea, no rhinorrhea, no shortness of breath, no sneezing, no vertigo and no vomiting    Congestion:     Location:  Nasal    Interferes with sleep: no      Interferes with eating/drinking: no    Ear pain:     Location:  Right    Severity:  Moderate    Onset quality:  Sudden    Duration:  1 day    Timing:  Constant    Progression:  Unchanged    Chronicity:  New  Sore throat:     Severity:  Moderate    Onset quality:  Sudden    Duration:  1 day    Timing:  Constant    Progression:  Unchanged  Risk factors: no asthma, no BiPAP, no COPD, no CPAP use, no diabetes, no immune deficiency and no nasal cannula        REVIEW OF SYSTEMS     Review of Systems   Constitutional: Negative for activity change, appetite change, chills, fatigue and fever. HENT: Positive for congestion, ear pain and sore throat. Negative for rhinorrhea, sinus pressure, sneezing and trouble swallowing. Respiratory: Negative for cough and shortness of breath. Cardiovascular: Negative for chest pain. Gastrointestinal: Negative for diarrhea, nausea and vomiting. Musculoskeletal: Negative for back pain and neck stiffness. Skin: Negative for rash. Allergic/Immunologic: Negative for environmental allergies. Neurological: Negative for dizziness, vertigo, light-headedness and headaches. Hematological: Negative for adenopathy. PAST MEDICAL HISTORY         Diagnosis Date    Concussion     Conversion disorder with weakness or paralysis, acute episode     Conversion disorder with weakness or paralysis, acute episode     Expressive aphasia     Pneumonia        SURGICALHISTORY     Patient  has a past surgical history that includes Tubal ligation. CURRENT MEDICATIONS       Discharge Medication List as of 5/11/2021  2:01 PM      CONTINUE these medications which have NOT CHANGED    Details   Loratadine (CLARITIN PO) Take by mouthHistorical Med      APPLE CIDER VINEGAR PO Take by mouthHistorical Med      VITAMIN D PO Take by mouthHistorical Med      acetaminophen (TYLENOL) 500 MG tablet Take 1,000 mg by mouth every 6 hours as needed for PainHistorical Med      OLIVE LEAF PO Take by mouthHistorical Med      Multiple Vitamins-Minerals (MULTIVITAMIN ADULT EXTRA C PO) Take by mouthHistorical Med             ALLERGIES     Patient is is allergic to adhesive tape.     Patients   Immunization History   Administered Date(s) Administered    Influenza Virus Vaccine 10/01/2017, 10/30/2018    Pneumococcal Polysaccharide ([id number]) 08/24/2012       FAMILY HISTORY     Patient's family history includes Cancer in her father, maternal grandfather, maternal grandmother, paternal grandfather, and paternal grandmother; Depression in her maternal grandfather; Early Death in an other family member; Heart Disease in an other family member; High Blood Pressure in her father, mother, and sister; High Cholesterol in her mother; Kidney Disease in her father; Other in her sister. SOCIAL HISTORY     Patient  reports that she has never smoked. She has never used smokeless tobacco. She reports current alcohol use. She reports that she does not use drugs. PHYSICAL EXAM     ED TRIAGE VITALS  BP: (!) 140/86, Temp: 98 °F (36.7 °C), Pulse: 97, Resp: 16, SpO2: 97 %,Estimated body mass index is 34.33 kg/m² as calculated from the following:    Height as of this encounter: 5' 4\" (1.626 m). Weight as of this encounter: 200 lb (90.7 kg). ,Patient's last menstrual period was 05/03/2021. Physical Exam  Vitals signs and nursing note reviewed. Constitutional:       General: She is awake. She is not in acute distress. Appearance: Normal appearance. She is well-developed and well-groomed. She is not ill-appearing, toxic-appearing or diaphoretic. HENT:      Head: Normocephalic. Right Ear: Hearing, tympanic membrane, ear canal and external ear normal. No drainage, swelling or tenderness. No mastoid tenderness. Tympanic membrane is not erythematous. Left Ear: Hearing, tympanic membrane, ear canal and external ear normal. No drainage, swelling or tenderness. No mastoid tenderness. Tympanic membrane is not erythematous. Nose: Congestion and rhinorrhea present. Right Turbinates: Swollen. Left Turbinates: Swollen. Right Sinus: No maxillary sinus tenderness or frontal sinus tenderness. Left Sinus: No maxillary sinus tenderness or frontal sinus tenderness. Mouth/Throat:      Lips: Pink. Mouth: Mucous membranes are moist.      Pharynx: Uvula midline. No oropharyngeal exudate, posterior oropharyngeal erythema or uvula swelling. Tonsils: No tonsillar exudate or tonsillar abscesses.    Eyes:      Conjunctiva/sclera: Conjunctivae normal. Resp: 16   Temp: 98 °F (36.7 °C)   TempSrc: Temporal   SpO2: 97%   Weight: 200 lb (90.7 kg)   Height: 5' 4\" (1.626 m)       Medications - No data to display         PROCEDURES:  None    FINAL IMPRESSION      1. Viral URI with cough    2. Acute non-recurrent frontal sinusitis    3. History of environmental allergies          DISPOSITION/ PLAN      The patient/Patient representative was advised to rest, drink lots of fluids, take Motrin and Tylenol for any fever, chills generalized body aches. The patient was also advised to monitor urine output for signs of dehydration. If the patient develops any chest pain, shortness of breath, neck pain or stiffness or abdominal pain or any other concerns, the patient is to call 911 or go to the emergency department for further evaluation. If the patient does not experience any of the above symptoms he is to follow-up with his primary care provider in 2-3 days for reevaluation. The patient/Patient representative are agreeable to the treatment plan at this time. The patient left the urgent care center in stable condition.     PATIENT REFERRED TO:  RONIT Zazueta CNP  [address] [place] [address]      DISCHARGE MEDICATIONS:  Discharge Medication List as of 5/11/2021  2:01 PM      START taking these medications    Details   amoxicillin (AMOXIL) 875 MG tablet Take 1 tablet by mouth 2 times daily for 10 days, Disp-20 tablet, R-0Normal      predniSONE (DELTASONE) 20 MG tablet Take 1 tablet by mouth 2 times daily for 5 days, Disp-10 tablet, R-0Normal      fluconazole (DIFLUCAN) 150 MG tablet Take 1 tablet by mouth once for 1 dose, Disp-1 tablet, R-0Normal             Discharge Medication List as of 5/11/2021  2:01 PM      STOP taking these medications       ibuprofen (IBU) 600 MG tablet Comments:   Reason for Stopping:               Discharge Medication List as of 5/11/2021  2:01 PM          RONIT Wiseman CNP    (Please note that portions of this note were completed with a voice recognition program. Efforts were made to edit the dictations but occasionally words are mis-transcribed.)           RONIT Rosenthal - HUSSAIN  05/11/21 [id number]

## 2021-05-12 ENCOUNTER — TELEPHONE (OUTPATIENT)
Dept: FAMILY MEDICINE CLINIC | Age: 41
End: 2021-05-12

## 2021-08-23 ENCOUNTER — HOSPITAL ENCOUNTER (EMERGENCY)
Age: 41
Discharge: HOME | End: 2021-08-23
Payer: COMMERCIAL

## 2021-08-23 VITALS
HEART RATE: 76 BPM | RESPIRATION RATE: 16 BRPM | OXYGEN SATURATION: 98 % | DIASTOLIC BLOOD PRESSURE: 85 MMHG | SYSTOLIC BLOOD PRESSURE: 143 MMHG | BODY MASS INDEX: 35.7 KG/M2 | TEMPERATURE: 98.3 F | WEIGHT: 208 LBS

## 2021-08-23 DIAGNOSIS — M25.561 ACUTE PAIN OF RIGHT KNEE: Primary | ICD-10-CM

## 2021-08-23 PROCEDURE — 99213 OFFICE O/P EST LOW 20 MIN: CPT

## 2021-08-23 PROCEDURE — 99213 OFFICE O/P EST LOW 20 MIN: CPT | Performed by: NURSE PRACTITIONER

## 2021-08-23 RX ORDER — IBUPROFEN 200 MG
400 TABLET ORAL EVERY 8 HOURS PRN
Qty: 120 TABLET | Refills: 3 | Status: ON HOLD | COMMUNITY
Start: 2021-08-23 | End: 2022-01-11 | Stop reason: HOSPADM

## 2021-08-23 RX ORDER — METHYLPREDNISOLONE 4 MG/1
TABLET ORAL
Qty: 1 KIT | Refills: 0 | Status: SHIPPED | OUTPATIENT
Start: 2021-08-23 | End: 2021-08-29

## 2021-08-23 ASSESSMENT — PAIN DESCRIPTION - PAIN TYPE: TYPE: ACUTE PAIN

## 2021-08-23 ASSESSMENT — PAIN SCALES - GENERAL: PAINLEVEL_OUTOF10: 6

## 2021-08-23 ASSESSMENT — PAIN DESCRIPTION - LOCATION: LOCATION: KNEE

## 2021-08-23 ASSESSMENT — PAIN DESCRIPTION - ORIENTATION: ORIENTATION: RIGHT

## 2021-08-23 NOTE — ED PROVIDER NOTES
Pneumococcal Polysaccharide ([id number]) 08/24/2012       FAMILY HISTORY     Patient's family history includes Cancer in her father, maternal grandfather, maternal grandmother, paternal grandfather, and paternal grandmother; Depression in her maternal grandfather; Early Death in an other family member; Heart Disease in an other family member; High Blood Pressure in her father, mother, and sister; High Cholesterol in her mother; Kidney Disease in her father; Other in her sister. SOCIAL HISTORY     Patient  reports that she has never smoked. She has never used smokeless tobacco. She reports current alcohol use. She reports that she does not use drugs. PHYSICAL EXAM     ED TRIAGE VITALS  BP: (!) 143/85, Temp: 98.3 °F (36.8 °C), Pulse: 76, Resp: 16, SpO2: 98 %,Estimated body mass index is 35.7 kg/m² as calculated from the following:    Height as of 5/11/21: 5' 4\" (1.626 m). Weight as of this encounter: 208 lb (94.3 kg). ,Patient's last menstrual period was 08/18/2021 (approximate). Physical Exam  Vitals and nursing note reviewed. Constitutional:       General: She is not in acute distress. Appearance: She is obese. She is not ill-appearing. Cardiovascular:      Rate and Rhythm: Normal rate and regular rhythm. Pulses: Normal pulses. Pulmonary:      Effort: Pulmonary effort is normal.   Musculoskeletal:         General: Tenderness present. No swelling, deformity or signs of injury. Right knee: No swelling, effusion or erythema. Normal range of motion. No MCL laxity, ACL laxity or PCL laxity. Normal pulse. Instability Tests: Anterior drawer test negative. Posterior drawer test negative. Medial Floyd test negative and lateral Floyd test negative. Skin:     General: Skin is warm and dry. Findings: No erythema. Neurological:      Mental Status: She is alert and oriented to person, place, and time.        DIAGNOSTIC RESULTS     Labs:No results found for this visit on 08/23/21. IMAGING:  None    EKG:  None    URGENT CARE COURSE:     Vitals:    08/23/21 1852   BP: (!) 143/85   Pulse: 76   Resp: 16   Temp: 98.3 °F (36.8 °C)   TempSrc: Temporal   SpO2: 98%   Weight: 208 lb (94.3 kg)       Medications - No data to display       PROCEDURES:  None    FINAL IMPRESSION      1. Acute pain of right knee      DISPOSITION/ PLAN   DISPOSITION Decision To Discharge 08/23/2021 07:17:50 PM     Clinical exam consistent with acute inflammation of the right knee. Will start patient on Medrol Dosepak. Advised to continue ibuprofen over-the-counter as instructed. Rest, ice, compress, and elevate knee as she has been doing. Follow-up with PCP as needed. [facility] if symptoms persist or worsen. Patient voiced understanding was agreeable with above-mentioned plan. Patient was discharged in stable condition. PATIENT REFERRED TO:  RONIT Nolan CNP  [address] [place] [address]      DISCHARGE MEDICATIONS:  New Prescriptions    IBUPROFEN (ADVIL) 200 MG TABLET    Take 2 tablets by mouth every 8 hours as needed for Pain    METHYLPREDNISOLONE (MEDROL, SAHIL,) 4 MG TABLET    Take by mouth.        Discontinued Medications    No medications on file       Current Discharge Medication List          RONIT Gonsalez CNP    (Please note that portions of this note were completed with a voice recognition program. Efforts were made to edit the dictations but occasionally words are mis-transcribed.)            RONIT Gonsalez CNP  08/26/21 5083

## 2021-08-23 NOTE — ED TRIAGE NOTES
Pt walked to room 7. Pt here with complaints of right knee pain. Pt states no injury. Has been bothering her for a month. Yesterday it started swelling.

## 2021-08-26 ASSESSMENT — ENCOUNTER SYMPTOMS: SHORTNESS OF BREATH: 0

## 2021-09-21 ENCOUNTER — HOSPITAL ENCOUNTER (OUTPATIENT)
Dept: GENERAL RADIOLOGY | Age: 41
Discharge: HOME | End: 2021-09-21
Payer: COMMERCIAL

## 2021-09-21 ENCOUNTER — OFFICE VISIT (OUTPATIENT)
Dept: FAMILY MEDICINE CLINIC | Age: 41
End: 2021-09-21
Payer: COMMERCIAL

## 2021-09-21 ENCOUNTER — NURSE TRIAGE (OUTPATIENT)
Dept: OTHER | Facility: CLINIC | Age: 41
End: 2021-09-21

## 2021-09-21 ENCOUNTER — HOSPITAL ENCOUNTER (OUTPATIENT)
Age: 41
Discharge: HOME | End: 2021-09-21
Payer: COMMERCIAL

## 2021-09-21 VITALS
WEIGHT: 207.3 LBS | DIASTOLIC BLOOD PRESSURE: 70 MMHG | SYSTOLIC BLOOD PRESSURE: 122 MMHG | RESPIRATION RATE: 16 BRPM | BODY MASS INDEX: 35.58 KG/M2 | HEART RATE: 76 BPM

## 2021-09-21 DIAGNOSIS — M22.2X1 PATELLOFEMORAL PAIN SYNDROME OF RIGHT KNEE: ICD-10-CM

## 2021-09-21 DIAGNOSIS — M25.461 KNEE EFFUSION, RIGHT: ICD-10-CM

## 2021-09-21 DIAGNOSIS — M22.2X1 PATELLOFEMORAL PAIN SYNDROME OF RIGHT KNEE: Primary | ICD-10-CM

## 2021-09-21 PROCEDURE — 73564 X-RAY EXAM KNEE 4 OR MORE: CPT

## 2021-09-21 PROCEDURE — 99213 OFFICE O/P EST LOW 20 MIN: CPT | Performed by: NURSE PRACTITIONER

## 2021-09-21 RX ORDER — MELOXICAM 15 MG/1
15 TABLET ORAL DAILY
Qty: 30 TABLET | Refills: 3 | Status: SHIPPED | OUTPATIENT
Start: 2021-09-21 | End: 2022-01-03 | Stop reason: ALTCHOICE

## 2021-09-21 ASSESSMENT — ENCOUNTER SYMPTOMS
COUGH: 0
NAUSEA: 0
ABDOMINAL PAIN: 0
SHORTNESS OF BREATH: 0

## 2021-09-21 NOTE — PATIENT INSTRUCTIONS
You may receive a survey about your visit with us today. The feedback from our patients helps us identify what is working well and where the service to all patients can be enhanced. Thank you! Patient Education        Patellofemoral Pain Syndrome: Care Instructions  Your Care Instructions     Patellofemoral pain syndrome is pain in the front of the knee. It is caused by overuse, weak thigh muscles (quadriceps), or a problem with the way the kneecap moves. Extra weight may also cause this syndrome. The patella is the kneecap, and the femur is the thighbone. Some people may have pain in the front of the knee from a condition called chondromalacia. In this problem, the underside of the knee cartilage wears down and frays. Cartilage is a rubbery tissue that cushions joints. In some cases, the kneecap does not move, or track, in a normal way. You may have knee pain when you run, walk down hills or steps, or do another activity. Sitting for a long time also can cause knee pain. Your knee pain may get better with medicines for pain and swelling and exercises to make your quadriceps stronger. Losing weight, if you need to, may also help with pain. Follow-up care is a key part of your treatment and safety. Be sure to make and go to all appointments, and call your doctor if you are having problems. It's also a good idea to know your test results and keep a list of the medicines you take. How can you care for yourself at home? · Ask your doctor if you can take an over-the-counter pain medicine, such as acetaminophen (Tylenol), ibuprofen (Advil, Motrin), or naproxen (Aleve). Be safe with medicines. Read and follow all instructions on the label. · Rest and protect your knee. Take a break from activities that cause pain, such as long periods of sitting or kneeling. · Put ice or a cold pack on your knee for 10 to 20 minutes after activity. Put a thin cloth between the ice and your skin.   · If your doctor recommends an elastic bandage, sleeve, or other type of support for your knee, wear it as directed. · If your knee is not swollen, you can put moist heat, a heating pad, or a warm cloth on your knee. After several days of rest, you can begin gentle exercise of your knee. · Reach and stay at a healthy weight. Being overweight puts stress on your knees. · Wear athletic shoes that offer good support, especially if you run. · Use shoe inserts, or orthotics, if they help reduce your knee pain. Many drugsFOXTOWN and shoe stores sell them. · See a physical therapist to learn more exercises and stretches to make your legs stronger. When should you call for help? Watch closely for changes in your health, and be sure to contact your doctor if:    · Your knee pain does not get better or gets worse. Where can you learn more? Go to https://[employer]. org and sign in to your [employer] account. Enter K550 in the GI Track box to learn more about \"Patellofemoral Pain Syndrome: Care Instructions. \"     If you do not have an account, please click on the \"Sign Up Now\" link. Current as of: November 16, 2020               Content Version: 12.9  © 2006-2021 [employer]. Care instructions adapted under license by [facility] ([facility]). If you have questions about a medical condition or this instruction, always ask your healthcare professional. [place] [address] any warranty or liability for your use of this information. Patient Education        Patellofemoral Pain Syndrome (Runner's Knee): Exercises  Introduction  Here are some examples of exercises for you to try. The exercises may be suggested for a condition or for rehabilitation. Start each exercise slowly. Ease off the exercises if you start to have pain. You will be told when to start these exercises and which ones will work best for you.   How to do the exercises  Calf wall stretch   1. Stand facing a wall with your hands on the wall at about eye level. Put your affected leg about a step behind your other leg. 2. Keeping your back leg straight and your back heel on the floor, bend your front knee and gently bring your hip and chest toward the wall until you feel a stretch in the calf of your back leg. 3. Hold the stretch for at least 15 to 30 seconds. 4. Repeat 2 to 4 times. 5. Repeat steps 1 through 4, but this time keep your back knee bent. Quadriceps stretch   1. If you are not steady on your feet, hold on to a chair, counter, or wall. 2. Bend your affected leg, and reach behind you to grab the front of your foot or ankle with the hand on the same side. For example, if you are stretching your right leg, use your right hand. 3. Keeping your knees next to each other, pull your foot toward your buttock until you feel a gentle stretch across the front of your hip and down the front of your thigh. Your knee should be pointed directly to the ground, and not out to the side. 4. Hold the stretch for at least 15 to 30 seconds. 5. Repeat 2 to 4 times. Hamstring wall stretch   1. Lie on your back in a doorway, with your good leg through the open door. 2. Slide your affected leg up the wall to straighten your knee. You should feel a gentle stretch down the back of your leg. 3. Hold the stretch for at least 1 minute. Then over time, try to lengthen the time you hold the stretch to as long as 6 minutes. 4. Repeat 2 to 4 times. 5. If you do not have a place to do this exercise in a doorway, there is another way to do it:  6. Lie on your back, and bend your affected leg. 7. Loop a towel under the ball and toes of that foot, and hold the ends of the towel in your hands. 8. Straighten your knee, and slowly pull back on the towel. You should feel a gentle stretch down the back of your leg. 9. Hold the stretch for at least 15 to 30 seconds. Or even better, hold the stretch for 1 minute if you can. 10. Repeat 2 to 4 times.   1. Do not arch your back. 2. Do not bend either knee. 3. Keep one heel touching the floor and the other heel touching the wall. Do not point your toes. Quad sets   1. Sit with your affected leg straight and supported on the floor or a firm bed. Place a small, rolled-up towel under your affected knee. Your other leg should be bent, with that foot flat on the floor. 2. Tighten the thigh muscles of your affected leg by pressing the back of your knee down into the towel. 3. Hold for about 6 seconds, then rest for up to 10 seconds. 4. Repeat 8 to 12 times. Straight-leg raises to the front   1. Lie on your back with your good knee bent so that your foot rests flat on the floor. Your affected leg should be straight. Make sure that your low back has a normal curve. You should be able to slip your hand in between the floor and the small of your back, with your palm touching the floor and your back touching the back of your hand. 2. Tighten the thigh muscles in your affected leg by pressing the back of your knee flat down to the floor. Hold your knee straight. 3. Keeping the thigh muscles tight and your leg straight, lift your affected leg up so that your heel is about 12 inches off the floor. 4. Hold for about 6 seconds, then lower your leg slowly. Rest for up to 10 seconds between repetitions. 5. Repeat 8 to 12 times. Straight-leg raises to the back   1. Lie on your stomach, and lift your leg straight up behind you (toward the ceiling). 2. Lift your toes about 6 inches off the floor, hold for about 6 seconds, then lower slowly. 3. Do 8 to 12 repetitions. Wall slide with ball squeeze   1. Stand with your back against a wall and with your feet about shoulder-width apart. Your feet should be about 12 inches away from the wall. 2. Put a ball about the size of a soccer ball between your knees. Then slowly slide down the wall until your knees are bent about 20 to 30 degrees.   3. Tighten your thigh muscles by squeezing the ball between your knees. Hold that position for about 10 seconds, then stop squeezing. Rest for up to 10 seconds between repetitions. 4. Repeat 8 to 12 times. Follow-up care is a key part of your treatment and safety. Be sure to make and go to all appointments, and call your doctor if you are having problems. It's also a good idea to know your test results and keep a list of the medicines you take. Where can you learn more? Go to https://[employer]i.[employer]. org and sign in to your LikeWhere account. Enter A404 in the Boundless box to learn more about \"Patellofemoral Pain Syndrome (Runner's Knee): Exercises. \"     If you do not have an account, please click on the \"Sign Up Now\" link. Current as of: November 16, 2020               Content Version: 12.9  © 5460-1016 [employer]. Care instructions adapted under license by [facility] ([facility]). If you have questions about a medical condition or this instruction, always ask your healthcare professional. [address] any warranty or liability for your use of this information.

## 2021-09-21 NOTE — PROGRESS NOTES
Teresa Guo (1980) 35 y.o. female here for evaluation of the following chief complaint(s):      HPI:  Chief Complaint   Patient presents with    Knee Pain     right knee    Edema     right knee       Onset of 1-2 months with knee pain. NKI Knee pain and swelling. Pain worsens with walking. Pain at top of knee. Denies instability. Click or pop at times. Swelling will then go down in the ankle    OTC: IBU, Aleve    Vitals:    09/21/21 1304   BP: 122/70   Pulse: 76   Resp: 16       Patient Active Problem List   Diagnosis    Hypokalemia    Cerebral concussion    Concussion    Conversion disorder with weakness or paralysis, acute episode    Expressive aphasia    TBI (traumatic brain injury) ([facility])    Dysarthria    Dizziness    Anxiety    SVT (supraventricular tachycardia) ([facility])       SUBJECTIVE/OBJECTIVE:  Review of Systems   Constitutional: Negative for chills and fever. HENT: Negative. Respiratory: Negative for cough and shortness of breath. Cardiovascular: Negative for chest pain. Gastrointestinal: Negative for abdominal pain and nausea. Musculoskeletal: Positive for arthralgias and joint swelling. Skin: Negative for rash. Neurological: Negative for dizziness, light-headedness and headaches. Psychiatric/Behavioral: Negative. Physical Exam  Constitutional:       General: She is not in acute distress. Eyes:      Pupils: Pupils are equal, round, and reactive to light. Cardiovascular:      Rate and Rhythm: Normal rate and regular rhythm. Heart sounds: No murmur heard. Pulmonary:      Effort: Pulmonary effort is normal.      Breath sounds: Normal breath sounds. No wheezing. Abdominal:      General: Bowel sounds are normal. There is no distension. Palpations: Abdomen is soft. Tenderness: There is no abdominal tenderness. Musculoskeletal:         General: Normal range of motion. Cervical back: Normal range of motion and neck supple. Right knee: Effusion present. No bony tenderness. Normal range of motion. Tenderness present. No medial joint line or lateral joint line tenderness. Legs:    Skin:     General: Skin is warm and dry. Findings: No rash. ASSESSMENT/PLAN:   Diagnosis Orders   1. Patellofemoral pain syndrome of right knee  meloxicam (MOBIC) 15 MG tablet    XR KNEE RIGHT (MIN 4 VIEWS)   2. Knee effusion, right  meloxicam (MOBIC) 15 MG tablet    XR KNEE RIGHT (MIN 4 VIEWS)         MDM: Mobic 15 mg Daily   XR KNEE   Stay active   If continued swelling will get MRI   RTO PRN      An electronic signature was used to authenticate this note.     --Jessica Coreas, APRN - CNP

## 2021-09-21 NOTE — TELEPHONE ENCOUNTER
Received call from Vicenta at [facility]-service [facility]) [address] [facility] with [employer] Complaint. Brief description of triage: knee pain and edema    Triage indicates for patient to see within 3 days in office. Pt informed if unable to get an appointment to go to urgent care or Emergency Department. Agreeable with plan. Care advice provided, patient verbalizes understanding; denies any other questions or concerns; instructed to call back for any new or worsening symptoms. Writer provided warm transfer to Sterling at [facility] for appointment scheduling. Attention Provider: Thank you for allowing me to participate in the care of your patient. The patient was connected to triage in response to information provided to the ECC. Please do not respond through this encounter as the response is not directed to a shared pool. Reason for Disposition   MODERATE pain (e.g., symptoms interfere with work or school, limping) and present > 3 days    Answer Assessment - Initial Assessment Questions  1. ONSET: \"When did the swelling start? \" (e.g., minutes, hours, days)      1 month knee pain and edema. Treated in ED 8/23 for same prescribed steroid dose pain and Ibuprofen at that time. 2. LOCATION: \"What part of the leg is swollen? \"  \"Are both legs swollen or just one leg? \"      Right knee pain and swelling. States when on her feet too much notices sometimes her ankle swells but goes back to normal if elevates legs. 3. SEVERITY: \"How bad is the swelling? \" (e.g., localized; mild, moderate, severe)   - Localized - small area of swelling localized to one leg   - MILD pedal edema - swelling limited to foot and ankle, pitting edema < 1/4 inch (6 mm) deep, rest and elevation eliminate most or all swelling   - MODERATE edema - swelling of lower leg to knee, pitting edema > 1/4 inch (6 mm) deep, rest and elevation only partially reduce swelling   - SEVERE edema - swelling extends above knee, facial or hand swelling present Knee edema only consistently. \"a little bit\" pitting    4. REDNESS: \"Does the swelling look red or infected? \"      No redness. Not hot to the touch    5. PAIN: \"Is the swelling painful to touch? \" If so, ask: \"How painful is it? \"   (Scale 1-10; mild, moderate or severe)      Pain increasingly worse. 6-7/10 taking Ibuprofen. 6. FEVER: \"Do you have a fever? \" If so, ask: \"What is it, how was it measured, and when did it start? \"       Denies    7. CAUSE: \"What do you think is causing the leg swelling? \"      Denies injury. After noticed swelling heard a popping noise in her knee. Did not cause initial swelling    8. MEDICAL HISTORY: \"Do you have a history of heart failure, kidney disease, liver failure, or cancer? \"      Denies    9. RECURRENT SYMPTOM: \"Have you had leg swelling before? \" If so, ask: \"When was the last time? \" \"What happened that time? \"      Denies    10. OTHER SYMPTOMS: \"Do you have any other symptoms? \" (e.g., chest pain, difficulty breathing)        Denies. Just flew back from [place] but s/s were prior to travel    11. PREGNANCY: \"Is there any chance you are pregnant? \" \"When was your last menstrual period? \"        LMP \"a couple weeks ago\"    Answer Assessment - Initial Assessment Questions  1. LOCATION and RADIATION: \"Where is the pain located? \"       Non-radiating    2. QUALITY: \"What does the pain feel like? \"  (e.g., sharp, dull, aching, burning)      Tight pressure, aching    3. SEVERITY: \"How bad is the pain? \" \"What does it keep you from doing? \"   (Scale 1-10; or mild, moderate, severe)    -  MILD (1-3): doesn't interfere with normal activities     -  MODERATE (4-7): interferes with normal activities (e.g., work or school) or awakens from sleep, limping     -  SEVERE (8-10): excruciating pain, unable to do any normal activities, unable to walk      6-7    4. ONSET: \"When did the pain start? \" \"Does it come and go, or is it there all the time? \"      Note above    5.  RECURRENT: \"Have you had this pain before? \" If so, ask: \"When, and what happened then? \"      Note above    6. SETTING: \"Has there been any recent work, exercise or other activity that involved that part of the body? \"       States works in a [employer] but no additional over use    7. AGGRAVATING FACTORS: \"What makes the knee pain worse? \" (e.g., walking, climbing stairs, running)      Stairs, walking, palpation    8. ASSOCIATED SYMPTOMS: \"Is there any swelling or redness of the knee? \"      Swelling    9. OTHER SYMPTOMS: \"Do you have any other symptoms? \" (e.g., chest pain, difficulty breathing, fever, calf pain)      Denies    10. PREGNANCY: \"Is there any chance you are pregnant? \" \"When was your last menstrual period? \"        Note above    Protocols used: KNEE PAIN-ADULT-OH, LEG SWELLING AND EDEMA-ADULT-OH

## 2021-10-05 ENCOUNTER — TELEMEDICINE (OUTPATIENT)
Dept: FAMILY MEDICINE CLINIC | Age: 41
End: 2021-10-05
Payer: COMMERCIAL

## 2021-10-05 DIAGNOSIS — F43.12 CHRONIC POST-TRAUMATIC STRESS DISORDER (PTSD): Primary | ICD-10-CM

## 2021-10-05 DIAGNOSIS — Z20.822 EXPOSURE TO COVID-19 VIRUS: ICD-10-CM

## 2021-10-05 DIAGNOSIS — F43.0 ACUTE STRESS DISORDER: ICD-10-CM

## 2021-10-05 PROCEDURE — 99213 OFFICE O/P EST LOW 20 MIN: CPT | Performed by: NURSE PRACTITIONER

## 2021-10-05 ASSESSMENT — ENCOUNTER SYMPTOMS
ABDOMINAL PAIN: 0
COUGH: 0
SHORTNESS OF BREATH: 0
NAUSEA: 0

## 2021-10-05 NOTE — PROGRESS NOTES
Subjective:      Patient ID: Gurpreet Diana is a 35 y.o. female    HPI: Acute for FMLA    TELEHEALTH EVALUATION -- Audio/Visual ([facility])    Patient identification was verified at the start of the visit: Yes    Services were provided through a video synchronous discussion virtually to substitute for in-person clinic visit. Patient and provider were located at their individual homes. Patient has requested an audio/video evaluation for the following concern(s):    Chief Complaint   Patient presents with   [address] Problem       Patient Active Problem List   Diagnosis    Hypokalemia    Cerebral concussion    Concussion    Conversion disorder with weakness or paralysis, acute episode    Expressive aphasia    [facility]) ([facility] [address]    Dysarthria    Dizziness    Anxiety    SVT (supraventricular tachycardia) ([facility] [address]       Denies CP, SOB or chest tightness. Denies joint pain.      Her PTSD associated with the murder of her son 6 years ago is starting to flare again. Kitty Alva would like [place] papers filled [place] so that she can have any points against her attendance at work. Yaz Angulo the PTSD occurs, she finds it hard to be functional at day.      She is currently using no psychotropic medications and wants to keep it that way if at all possible. Review of Systems   Constitutional: Negative for chills and fever. HENT: Negative. Respiratory: Negative for cough and shortness of breath. Cardiovascular: Negative for chest pain. Gastrointestinal: Negative for abdominal pain and nausea. Skin: Negative for rash. Neurological: Negative for dizziness, light-headedness and headaches. Psychiatric/Behavioral: Negative. Objective:   Physical Exam  Constitutional:       General: She is not in acute distress. Appearance: She is not ill-appearing. Pulmonary:      Effort: Pulmonary effort is normal. No respiratory distress.    Neurological:      Mental Status: She is alert and oriented to person, place, and time. Psychiatric:         Mood and Affect: Mood normal.         Behavior: Behavior normal.         Assessment:       Diagnosis Orders   1. Chronic post-traumatic stress disorder (PTSD)     2. Acute stress disorder     3. Exposure to COVID-19 virus  Covid-19, Antibody, Total       Plan:     FMLA will be complete  COVID Antibody in 1 month  Healthy Lifestyles discussed  RTO PRN       Due to this being a TeleHealth encounter ([facility]), evaluation of the following organ systems was limited: Vitals/Constitutional/EENT/Resp/CV/GI//MS/Neuro/Skin/Heme-Lymph-Imm. Pursuant to the emergency declaration under the [address] [place] [address] authority and the [employer] Act, this Virtual Visit was conducted with patient's (and/or legal guardian's) consent, to reduce the patient's risk of exposure to COVID-19 and provide necessary medical care. The patient (and/or legal guardian) has also been advised to contact this office for worsening conditions or problems, and seek emergency medical treatment and/or call 911 if deemed necessary. --RONIT Smith CNP on 10/5/2021 at 2:16 PM    An electronic signature was used to authenticate this note.      10/5/2021

## 2021-10-11 ENCOUNTER — TELEPHONE (OUTPATIENT)
Dept: FAMILY MEDICINE CLINIC | Age: 41
End: 2021-10-11

## 2021-10-11 NOTE — TELEPHONE ENCOUNTER
Patient calling to let us know she dropped off FMLA forms today and they need to be faxed back to #[phone] Attn: Radha Vasquez when completed.

## 2021-12-08 ENCOUNTER — HOSPITAL ENCOUNTER (OUTPATIENT)
Age: 41
Discharge: HOME | End: 2021-12-08
Payer: COMMERCIAL

## 2021-12-08 LAB
BASOPHILS # BLD: 0.4 %
BASOPHILS ABSOLUTE: 0 THOU/MM3 (ref 0–0.1)
EKG ATRIAL RATE: 73 BPM
EKG P AXIS: 32 DEGREES
EKG P-R INTERVAL: 156 MS
EKG Q-T INTERVAL: 382 MS
EKG QRS DURATION: 100 MS
EKG QTC CALCULATION (BAZETT): 420 MS
EKG R AXIS: -4 DEGREES
EKG T AXIS: -9 DEGREES
EKG VENTRICULAR RATE: 73 BPM
EOSINOPHIL # BLD: 4.4 %
EOSINOPHILS ABSOLUTE: 0.2 THOU/MM3 (ref 0–0.4)
ERYTHROCYTE [DISTWIDTH] IN BLOOD BY AUTOMATED COUNT: 12.3 % (ref 11.5–14.5)
ERYTHROCYTE [DISTWIDTH] IN BLOOD BY AUTOMATED COUNT: 42.6 FL (ref 35–45)
HCT VFR BLD CALC: 41.4 % (ref 37–47)
HEMOGLOBIN: 13.7 GM/DL (ref 12–16)
IMMATURE GRANS (ABS): 0.01 THOU/MM3 (ref 0–0.07)
IMMATURE GRANULOCYTES: 0.2 %
LYMPHOCYTES # BLD: 45.2 %
LYMPHOCYTES ABSOLUTE: 2.4 THOU/MM3 (ref 1–4.8)
MCH RBC QN AUTO: 31.3 PG (ref 26–33)
MCHC RBC AUTO-ENTMCNC: 33.1 GM/DL (ref 32.2–35.5)
MCV RBC AUTO: 94.5 FL (ref 81–99)
MONOCYTES # BLD: 9.2 %
MONOCYTES ABSOLUTE: 0.5 THOU/MM3 (ref 0.4–1.3)
NUCLEATED RED BLOOD CELLS: 0 /100 WBC
PLATELET # BLD: 279 THOU/MM3 (ref 130–400)
PMV BLD AUTO: 9.6 FL (ref 9.4–12.4)
POTASSIUM SERPL-SCNC: 3.8 MEQ/L (ref 3.5–5.2)
RBC # BLD: 4.38 MILL/MM3 (ref 4.2–5.4)
SEG NEUTROPHILS: 40.6 %
SEGMENTED NEUTROPHILS ABSOLUTE COUNT: 2.1 THOU/MM3 (ref 1.8–7.7)
WBC # BLD: 5.2 THOU/MM3 (ref 4.8–10.8)

## 2021-12-08 PROCEDURE — 36415 COLL VENOUS BLD VENIPUNCTURE: CPT

## 2021-12-08 PROCEDURE — 93010 ELECTROCARDIOGRAM REPORT: CPT | Performed by: NUCLEAR MEDICINE

## 2021-12-08 PROCEDURE — 93005 ELECTROCARDIOGRAM TRACING: CPT | Performed by: OBSTETRICS & GYNECOLOGY

## 2021-12-08 PROCEDURE — 85025 COMPLETE CBC W/AUTO DIFF WBC: CPT

## 2021-12-08 PROCEDURE — 84132 ASSAY OF SERUM POTASSIUM: CPT

## 2021-12-14 ENCOUNTER — HOSPITAL ENCOUNTER (OUTPATIENT)
Age: 41
Setting detail: SPECIMEN
Discharge: HOME | End: 2021-12-14

## 2021-12-15 LAB
DIRECT EXAM: ABNORMAL
Lab: ABNORMAL
SPECIMEN DESCRIPTION: ABNORMAL

## 2021-12-16 LAB
C. TRACHOMATIS DNA ,URINE: NEGATIVE
N. GONORRHOEAE DNA, URINE: NEGATIVE
SOURCE: NORMAL
SPECIMEN DESCRIPTION: NORMAL
TRICHOMONAS VAGINALI, MOLECULAR: NEGATIVE

## 2022-01-03 NOTE — PROGRESS NOTES
In preparation for their surgical procedure above patient was screened for Obstructive Sleep Apnea (TERRI) using the STOP-Bang Questionnaire by the Pre-Admission Testing department. This is a pre-surgical screening tool for patient safety and serves as a recommendation, this WILL NOT cause cancellation of surgery. STOP-Bang Questionnaire  * Do you currently see a pulmonologist?  No     If yes STOP, do not complete. Patient follows with      1.  Do you snore loudly (able to be heard in the next room)? Yes    2. Do you often feel tired or sleepy during the daytime? No       3. Has anyone ever told you that you stop breathing during your sleep? No    4. Do you have or are you being treated for high blood pressure? No      5. BMI more than 35? BMI (Calculated): 34.4        No    6. Age over 48 years? 35 y.o. No    7. Neck Circumference greater than 17 inches for male or 16 inches for female? Measured           (visits only)            Not Applicable    8. Gender Male? No      TOTAL SCORE: 1    TERRI - Low Risk : Yes to 0 - 2 questions  TERRI - Intermediate Risk : Yes to 3 - 4 questions  TERRI - High Risk : Yes to 5 - 8 questions    Adapted from:   STOP Questionnaire: A Tool to Screen Patients for Obstructive Sleep Apnea   ALIYAH GoodrichC.P.C., NANNETTE Mckeon.B.B.S., Sajan Johnston M.D., [facility]. Nate Durbin, Ph.D., NANNETTE Berg.B.B.S., NANNETTE Larsen.Sc., Teo Contreras M.D., [facility]. NELIDA Rust.P.C.    Anesthesiology 2008; 244:502-61 Copyright 2008, the [address] of Anesthesiologists, jack[place] [address].   ----------------------------------------------------------------------------------------------------------------

## 2022-01-10 ENCOUNTER — ANESTHESIA EVENT (OUTPATIENT)
Dept: OPERATING ROOM | Age: 42
DRG: 743 | End: 2022-01-10
Payer: COMMERCIAL

## 2022-01-10 ENCOUNTER — ANESTHESIA (OUTPATIENT)
Dept: OPERATING ROOM | Age: 42
DRG: 743 | End: 2022-01-10
Payer: COMMERCIAL

## 2022-01-10 ENCOUNTER — HOSPITAL ENCOUNTER (INPATIENT)
Age: 42
LOS: 2 days | Discharge: HOME | DRG: 743 | End: 2022-01-12
Attending: OBSTETRICS & GYNECOLOGY | Admitting: OBSTETRICS & GYNECOLOGY
Payer: COMMERCIAL

## 2022-01-10 VITALS — SYSTOLIC BLOOD PRESSURE: 130 MMHG | OXYGEN SATURATION: 100 % | DIASTOLIC BLOOD PRESSURE: 73 MMHG | TEMPERATURE: 96.8 F

## 2022-01-10 DIAGNOSIS — Z90.710 S/P TAH (TOTAL ABDOMINAL HYSTERECTOMY): Primary | ICD-10-CM

## 2022-01-10 LAB
ABO: NORMAL
ANTIBODY SCREEN: NORMAL
BASOPHILS # BLD: 0.4 %
BASOPHILS ABSOLUTE: 0 THOU/MM3 (ref 0–0.1)
EOSINOPHIL # BLD: 2 %
EOSINOPHILS ABSOLUTE: 0.1 THOU/MM3 (ref 0–0.4)
ERYTHROCYTE [DISTWIDTH] IN BLOOD BY AUTOMATED COUNT: 12.4 % (ref 11.5–14.5)
ERYTHROCYTE [DISTWIDTH] IN BLOOD BY AUTOMATED COUNT: 41.7 FL (ref 35–45)
HCT VFR BLD CALC: 39.6 % (ref 37–47)
HEMOGLOBIN: 13.3 GM/DL (ref 12–16)
IMMATURE GRANS (ABS): 0.01 THOU/MM3 (ref 0–0.07)
IMMATURE GRANULOCYTES: 0.2 %
LYMPHOCYTES # BLD: 47 %
LYMPHOCYTES ABSOLUTE: 2.6 THOU/MM3 (ref 1–4.8)
MCH RBC QN AUTO: 31.1 PG (ref 26–33)
MCHC RBC AUTO-ENTMCNC: 33.6 GM/DL (ref 32.2–35.5)
MCV RBC AUTO: 92.7 FL (ref 81–99)
MONOCYTES # BLD: 12.6 %
MONOCYTES ABSOLUTE: 0.7 THOU/MM3 (ref 0.4–1.3)
NUCLEATED RED BLOOD CELLS: 0 /100 WBC
PLATELET # BLD: 335 THOU/MM3 (ref 130–400)
PMV BLD AUTO: 9.5 FL (ref 9.4–12.4)
PREGNANCY, URINE: NEGATIVE
RBC # BLD: 4.27 MILL/MM3 (ref 4.2–5.4)
RH FACTOR: NORMAL
SEG NEUTROPHILS: 37.8 %
SEGMENTED NEUTROPHILS ABSOLUTE COUNT: 2.1 THOU/MM3 (ref 1.8–7.7)
WBC # BLD: 5.6 THOU/MM3 (ref 4.8–10.8)

## 2022-01-10 PROCEDURE — 3600000014 HC SURGERY LEVEL 4 ADDTL 15MIN: Performed by: OBSTETRICS & GYNECOLOGY

## 2022-01-10 PROCEDURE — 1200000000 HC SEMI PRIVATE

## 2022-01-10 PROCEDURE — 3700000001 HC ADD 15 MINUTES (ANESTHESIA): Performed by: OBSTETRICS & GYNECOLOGY

## 2022-01-10 PROCEDURE — 3600000004 HC SURGERY LEVEL 4 BASE: Performed by: OBSTETRICS & GYNECOLOGY

## 2022-01-10 PROCEDURE — 2580000003 HC RX 258: Performed by: OBSTETRICS & GYNECOLOGY

## 2022-01-10 PROCEDURE — 86850 RBC ANTIBODY SCREEN: CPT

## 2022-01-10 PROCEDURE — 2720000010 HC SURG SUPPLY STERILE: Performed by: OBSTETRICS & GYNECOLOGY

## 2022-01-10 PROCEDURE — 2500000003 HC RX 250 WO HCPCS

## 2022-01-10 PROCEDURE — 7100000000 HC PACU RECOVERY - FIRST 15 MIN: Performed by: OBSTETRICS & GYNECOLOGY

## 2022-01-10 PROCEDURE — 6360000002 HC RX W HCPCS: Performed by: OBSTETRICS & GYNECOLOGY

## 2022-01-10 PROCEDURE — 85025 COMPLETE CBC W/AUTO DIFF WBC: CPT

## 2022-01-10 PROCEDURE — 6360000002 HC RX W HCPCS: Performed by: ANESTHESIOLOGY

## 2022-01-10 PROCEDURE — 7100000001 HC PACU RECOVERY - ADDTL 15 MIN: Performed by: OBSTETRICS & GYNECOLOGY

## 2022-01-10 PROCEDURE — 6360000002 HC RX W HCPCS

## 2022-01-10 PROCEDURE — 6370000000 HC RX 637 (ALT 250 FOR IP): Performed by: OBSTETRICS & GYNECOLOGY

## 2022-01-10 PROCEDURE — 3700000000 HC ANESTHESIA ATTENDED CARE: Performed by: OBSTETRICS & GYNECOLOGY

## 2022-01-10 PROCEDURE — 7100000010 HC PHASE II RECOVERY - FIRST 15 MIN: Performed by: OBSTETRICS & GYNECOLOGY

## 2022-01-10 PROCEDURE — 6370000000 HC RX 637 (ALT 250 FOR IP)

## 2022-01-10 PROCEDURE — 7100000011 HC PHASE II RECOVERY - ADDTL 15 MIN: Performed by: OBSTETRICS & GYNECOLOGY

## 2022-01-10 PROCEDURE — 36415 COLL VENOUS BLD VENIPUNCTURE: CPT

## 2022-01-10 PROCEDURE — 88307 TISSUE EXAM BY PATHOLOGIST: CPT

## 2022-01-10 PROCEDURE — 0UT90ZZ RESECTION OF UTERUS, OPEN APPROACH: ICD-10-PCS | Performed by: OBSTETRICS & GYNECOLOGY

## 2022-01-10 PROCEDURE — 0UT70ZZ RESECTION OF BILATERAL FALLOPIAN TUBES, OPEN APPROACH: ICD-10-PCS | Performed by: OBSTETRICS & GYNECOLOGY

## 2022-01-10 PROCEDURE — 86901 BLOOD TYPING SEROLOGIC RH(D): CPT

## 2022-01-10 PROCEDURE — 86900 BLOOD TYPING SEROLOGIC ABO: CPT

## 2022-01-10 PROCEDURE — 2709999900 HC NON-CHARGEABLE SUPPLY: Performed by: OBSTETRICS & GYNECOLOGY

## 2022-01-10 PROCEDURE — 81025 URINE PREGNANCY TEST: CPT

## 2022-01-10 PROCEDURE — 2500000003 HC RX 250 WO HCPCS: Performed by: ANESTHESIOLOGY

## 2022-01-10 RX ORDER — HYDRALAZINE HYDROCHLORIDE 20 MG/ML
INJECTION INTRAMUSCULAR; INTRAVENOUS
Status: COMPLETED
Start: 2022-01-10 | End: 2022-01-10

## 2022-01-10 RX ORDER — DEXAMETHASONE SODIUM PHOSPHATE 10 MG/ML
INJECTION, EMULSION INTRAMUSCULAR; INTRAVENOUS PRN
Status: DISCONTINUED | OUTPATIENT
Start: 2022-01-10 | End: 2022-01-10 | Stop reason: SDUPTHER

## 2022-01-10 RX ORDER — MORPHINE SULFATE 2 MG/ML
2 INJECTION, SOLUTION INTRAMUSCULAR; INTRAVENOUS
Status: DISCONTINUED | OUTPATIENT
Start: 2022-01-10 | End: 2022-01-12 | Stop reason: HOSPADM

## 2022-01-10 RX ORDER — SODIUM CHLORIDE 0.9 % (FLUSH) 0.9 %
10 SYRINGE (ML) INJECTION PRN
Status: DISCONTINUED | OUTPATIENT
Start: 2022-01-10 | End: 2022-01-12 | Stop reason: HOSPADM

## 2022-01-10 RX ORDER — LABETALOL 20 MG/4 ML (5 MG/ML) INTRAVENOUS SYRINGE
10 EVERY 10 MIN PRN
Status: DISCONTINUED | OUTPATIENT
Start: 2022-01-10 | End: 2022-01-10

## 2022-01-10 RX ORDER — MIDAZOLAM HYDROCHLORIDE 1 MG/ML
INJECTION INTRAMUSCULAR; INTRAVENOUS PRN
Status: DISCONTINUED | OUTPATIENT
Start: 2022-01-10 | End: 2022-01-10 | Stop reason: SDUPTHER

## 2022-01-10 RX ORDER — HYDRALAZINE HYDROCHLORIDE 20 MG/ML
10 INJECTION INTRAMUSCULAR; INTRAVENOUS ONCE
Status: COMPLETED | OUTPATIENT
Start: 2022-01-10 | End: 2022-01-10

## 2022-01-10 RX ORDER — LORATADINE 10 MG/1
10 TABLET ORAL DAILY PRN
Status: DISCONTINUED | OUTPATIENT
Start: 2022-01-10 | End: 2022-01-12 | Stop reason: HOSPADM

## 2022-01-10 RX ORDER — OXYCODONE HYDROCHLORIDE AND ACETAMINOPHEN 5; 325 MG/1; MG/1
2 TABLET ORAL EVERY 4 HOURS PRN
Status: DISCONTINUED | OUTPATIENT
Start: 2022-01-10 | End: 2022-01-12 | Stop reason: HOSPADM

## 2022-01-10 RX ORDER — SODIUM CHLORIDE 0.9 % (FLUSH) 0.9 %
10 SYRINGE (ML) INJECTION EVERY 12 HOURS SCHEDULED
Status: DISCONTINUED | OUTPATIENT
Start: 2022-01-10 | End: 2022-01-10

## 2022-01-10 RX ORDER — ROCURONIUM BROMIDE 10 MG/ML
INJECTION, SOLUTION INTRAVENOUS PRN
Status: DISCONTINUED | OUTPATIENT
Start: 2022-01-10 | End: 2022-01-10 | Stop reason: SDUPTHER

## 2022-01-10 RX ORDER — LIDOCAINE HYDROCHLORIDE 20 MG/ML
INJECTION, SOLUTION INTRAVENOUS PRN
Status: DISCONTINUED | OUTPATIENT
Start: 2022-01-10 | End: 2022-01-10 | Stop reason: SDUPTHER

## 2022-01-10 RX ORDER — SODIUM CHLORIDE 9 MG/ML
INJECTION, SOLUTION INTRAVENOUS CONTINUOUS
Status: DISCONTINUED | OUTPATIENT
Start: 2022-01-10 | End: 2022-01-11

## 2022-01-10 RX ORDER — ONDANSETRON 4 MG/1
4 TABLET, ORALLY DISINTEGRATING ORAL EVERY 8 HOURS PRN
Status: DISCONTINUED | OUTPATIENT
Start: 2022-01-10 | End: 2022-01-12 | Stop reason: HOSPADM

## 2022-01-10 RX ORDER — KETOROLAC TROMETHAMINE 30 MG/ML
30 INJECTION, SOLUTION INTRAMUSCULAR; INTRAVENOUS EVERY 6 HOURS
Status: DISCONTINUED | OUTPATIENT
Start: 2022-01-10 | End: 2022-01-12 | Stop reason: HOSPADM

## 2022-01-10 RX ORDER — PROPOFOL 10 MG/ML
INJECTION, EMULSION INTRAVENOUS PRN
Status: DISCONTINUED | OUTPATIENT
Start: 2022-01-10 | End: 2022-01-10 | Stop reason: SDUPTHER

## 2022-01-10 RX ORDER — OXYCODONE HYDROCHLORIDE AND ACETAMINOPHEN 5; 325 MG/1; MG/1
1 TABLET ORAL EVERY 4 HOURS PRN
Status: DISCONTINUED | OUTPATIENT
Start: 2022-01-10 | End: 2022-01-12 | Stop reason: HOSPADM

## 2022-01-10 RX ORDER — MORPHINE SULFATE 2 MG/ML
2 INJECTION, SOLUTION INTRAMUSCULAR; INTRAVENOUS EVERY 5 MIN PRN
Status: DISCONTINUED | OUTPATIENT
Start: 2022-01-10 | End: 2022-01-10

## 2022-01-10 RX ORDER — KETOROLAC TROMETHAMINE 30 MG/ML
INJECTION, SOLUTION INTRAMUSCULAR; INTRAVENOUS PRN
Status: DISCONTINUED | OUTPATIENT
Start: 2022-01-10 | End: 2022-01-10 | Stop reason: SDUPTHER

## 2022-01-10 RX ORDER — SODIUM CHLORIDE 9 MG/ML
25 INJECTION, SOLUTION INTRAVENOUS PRN
Status: DISCONTINUED | OUTPATIENT
Start: 2022-01-10 | End: 2022-01-10

## 2022-01-10 RX ORDER — ONDANSETRON 2 MG/ML
4 INJECTION INTRAMUSCULAR; INTRAVENOUS EVERY 6 HOURS PRN
Status: DISCONTINUED | OUTPATIENT
Start: 2022-01-10 | End: 2022-01-12 | Stop reason: HOSPADM

## 2022-01-10 RX ORDER — ACETAMINOPHEN 325 MG/1
650 TABLET ORAL EVERY 4 HOURS PRN
Status: DISCONTINUED | OUTPATIENT
Start: 2022-01-10 | End: 2022-01-12 | Stop reason: HOSPADM

## 2022-01-10 RX ORDER — FENTANYL CITRATE 50 UG/ML
INJECTION, SOLUTION INTRAMUSCULAR; INTRAVENOUS PRN
Status: DISCONTINUED | OUTPATIENT
Start: 2022-01-10 | End: 2022-01-10 | Stop reason: SDUPTHER

## 2022-01-10 RX ORDER — GLYCOPYRROLATE 1 MG/5 ML
SYRINGE (ML) INTRAVENOUS PRN
Status: DISCONTINUED | OUTPATIENT
Start: 2022-01-10 | End: 2022-01-10 | Stop reason: SDUPTHER

## 2022-01-10 RX ORDER — IBUPROFEN 400 MG/1
800 TABLET ORAL EVERY 8 HOURS PRN
Status: DISCONTINUED | OUTPATIENT
Start: 2022-01-12 | End: 2022-01-12 | Stop reason: HOSPADM

## 2022-01-10 RX ORDER — SODIUM CHLORIDE 9 MG/ML
25 INJECTION, SOLUTION INTRAVENOUS PRN
Status: DISCONTINUED | OUTPATIENT
Start: 2022-01-10 | End: 2022-01-12 | Stop reason: HOSPADM

## 2022-01-10 RX ORDER — SODIUM CHLORIDE 0.9 % (FLUSH) 0.9 %
10 SYRINGE (ML) INJECTION PRN
Status: DISCONTINUED | OUTPATIENT
Start: 2022-01-10 | End: 2022-01-10

## 2022-01-10 RX ORDER — ONDANSETRON 2 MG/ML
INJECTION INTRAMUSCULAR; INTRAVENOUS PRN
Status: DISCONTINUED | OUTPATIENT
Start: 2022-01-10 | End: 2022-01-10 | Stop reason: SDUPTHER

## 2022-01-10 RX ORDER — SODIUM CHLORIDE 9 MG/ML
INJECTION, SOLUTION INTRAVENOUS CONTINUOUS
Status: DISCONTINUED | OUTPATIENT
Start: 2022-01-10 | End: 2022-01-10

## 2022-01-10 RX ORDER — OXYCODONE HYDROCHLORIDE AND ACETAMINOPHEN 5; 325 MG/1; MG/1
TABLET ORAL
Status: COMPLETED
Start: 2022-01-10 | End: 2022-01-10

## 2022-01-10 RX ORDER — MORPHINE SULFATE 2 MG/ML
4 INJECTION, SOLUTION INTRAMUSCULAR; INTRAVENOUS
Status: DISCONTINUED | OUTPATIENT
Start: 2022-01-10 | End: 2022-01-12 | Stop reason: HOSPADM

## 2022-01-10 RX ORDER — SODIUM CHLORIDE 0.9 % (FLUSH) 0.9 %
10 SYRINGE (ML) INJECTION EVERY 12 HOURS SCHEDULED
Status: DISCONTINUED | OUTPATIENT
Start: 2022-01-10 | End: 2022-01-12 | Stop reason: HOSPADM

## 2022-01-10 RX ORDER — FENTANYL CITRATE 50 UG/ML
50 INJECTION, SOLUTION INTRAMUSCULAR; INTRAVENOUS EVERY 5 MIN PRN
Status: COMPLETED | OUTPATIENT
Start: 2022-01-10 | End: 2022-01-10

## 2022-01-10 RX ORDER — NEOSTIGMINE METHYLSULFATE 5 MG/5 ML
SYRINGE (ML) INTRAVENOUS PRN
Status: DISCONTINUED | OUTPATIENT
Start: 2022-01-10 | End: 2022-01-10 | Stop reason: SDUPTHER

## 2022-01-10 RX ADMIN — OXYCODONE AND ACETAMINOPHEN 2 TABLET: 5; 325 TABLET ORAL at 16:19

## 2022-01-10 RX ADMIN — HYDROMORPHONE HYDROCHLORIDE 0.5 MG: 1 INJECTION, SOLUTION INTRAMUSCULAR; INTRAVENOUS; SUBCUTANEOUS at 14:36

## 2022-01-10 RX ADMIN — FENTANYL CITRATE 50 MCG: 50 INJECTION, SOLUTION INTRAMUSCULAR; INTRAVENOUS at 14:20

## 2022-01-10 RX ADMIN — HYDROMORPHONE HYDROCHLORIDE 0.5 MG: 1 INJECTION, SOLUTION INTRAMUSCULAR; INTRAVENOUS; SUBCUTANEOUS at 14:41

## 2022-01-10 RX ADMIN — MIDAZOLAM 2 MG: 1 INJECTION INTRAMUSCULAR; INTRAVENOUS at 12:00

## 2022-01-10 RX ADMIN — FENTANYL CITRATE 50 MCG: 50 INJECTION, SOLUTION INTRAMUSCULAR; INTRAVENOUS at 14:52

## 2022-01-10 RX ADMIN — FENTANYL CITRATE 50 MCG: 50 INJECTION, SOLUTION INTRAMUSCULAR; INTRAVENOUS at 14:28

## 2022-01-10 RX ADMIN — SODIUM CHLORIDE: 9 INJECTION, SOLUTION INTRAVENOUS at 10:53

## 2022-01-10 RX ADMIN — ONDANSETRON 4 MG: 2 INJECTION INTRAMUSCULAR; INTRAVENOUS at 18:13

## 2022-01-10 RX ADMIN — LABETALOL 20 MG/4 ML (5 MG/ML) INTRAVENOUS SYRINGE 10 MG: at 15:04

## 2022-01-10 RX ADMIN — HYDRALAZINE HYDROCHLORIDE 10 MG: 20 INJECTION INTRAMUSCULAR; INTRAVENOUS at 15:35

## 2022-01-10 RX ADMIN — PROPOFOL 200 MG: 10 INJECTION, EMULSION INTRAVENOUS at 12:03

## 2022-01-10 RX ADMIN — Medication 5 MG: at 13:58

## 2022-01-10 RX ADMIN — FENTANYL CITRATE 50 MCG: 50 INJECTION, SOLUTION INTRAMUSCULAR; INTRAVENOUS at 12:08

## 2022-01-10 RX ADMIN — OXYCODONE AND ACETAMINOPHEN 2 TABLET: 5; 325 TABLET ORAL at 20:25

## 2022-01-10 RX ADMIN — ROCURONIUM BROMIDE 50 MG: 10 INJECTION INTRAVENOUS at 12:03

## 2022-01-10 RX ADMIN — LABETALOL 20 MG/4 ML (5 MG/ML) INTRAVENOUS SYRINGE 10 MG: at 15:18

## 2022-01-10 RX ADMIN — ONDANSETRON HYDROCHLORIDE 4 MG: 4 INJECTION, SOLUTION INTRAMUSCULAR; INTRAVENOUS at 13:49

## 2022-01-10 RX ADMIN — MORPHINE SULFATE 2 MG: 2 INJECTION, SOLUTION INTRAMUSCULAR; INTRAVENOUS at 22:47

## 2022-01-10 RX ADMIN — OXYCODONE HYDROCHLORIDE AND ACETAMINOPHEN 2 TABLET: 5; 325 TABLET ORAL at 16:19

## 2022-01-10 RX ADMIN — Medication 0.8 MG: at 13:58

## 2022-01-10 RX ADMIN — FENTANYL CITRATE 50 MCG: 50 INJECTION, SOLUTION INTRAMUSCULAR; INTRAVENOUS at 14:07

## 2022-01-10 RX ADMIN — FENTANYL CITRATE 50 MCG: 50 INJECTION, SOLUTION INTRAMUSCULAR; INTRAVENOUS at 14:47

## 2022-01-10 RX ADMIN — DEXAMETHASONE SODIUM PHOSPHATE 10 MG: 10 INJECTION, EMULSION INTRAMUSCULAR; INTRAVENOUS at 12:14

## 2022-01-10 RX ADMIN — SODIUM CHLORIDE: 9 INJECTION, SOLUTION INTRAVENOUS at 13:40

## 2022-01-10 RX ADMIN — LIDOCAINE HYDROCHLORIDE 100 MG: 20 INJECTION, SOLUTION INTRAVENOUS at 12:03

## 2022-01-10 RX ADMIN — KETOROLAC TROMETHAMINE 30 MG: 30 INJECTION, SOLUTION INTRAMUSCULAR; INTRAVENOUS at 13:49

## 2022-01-10 RX ADMIN — KETOROLAC TROMETHAMINE 30 MG: 30 INJECTION, SOLUTION INTRAMUSCULAR; INTRAVENOUS at 18:48

## 2022-01-10 RX ADMIN — FENTANYL CITRATE 50 MCG: 50 INJECTION, SOLUTION INTRAMUSCULAR; INTRAVENOUS at 12:00

## 2022-01-10 RX ADMIN — MORPHINE SULFATE 4 MG: 2 INJECTION, SOLUTION INTRAMUSCULAR; INTRAVENOUS at 18:48

## 2022-01-10 RX ADMIN — CEFAZOLIN 2000 MG: 10 INJECTION, POWDER, FOR SOLUTION INTRAVENOUS at 12:14

## 2022-01-10 RX ADMIN — FENTANYL CITRATE 50 MCG: 50 INJECTION, SOLUTION INTRAMUSCULAR; INTRAVENOUS at 14:04

## 2022-01-10 ASSESSMENT — PULMONARY FUNCTION TESTS
PIF_VALUE: 5
PIF_VALUE: 20
PIF_VALUE: 7
PIF_VALUE: 20
PIF_VALUE: 1
PIF_VALUE: 21
PIF_VALUE: 22
PIF_VALUE: 20
PIF_VALUE: 21
PIF_VALUE: 19
PIF_VALUE: 20
PIF_VALUE: 3
PIF_VALUE: 20
PIF_VALUE: 22
PIF_VALUE: 18
PIF_VALUE: 21
PIF_VALUE: 20
PIF_VALUE: 22
PIF_VALUE: 20
PIF_VALUE: 3
PIF_VALUE: 20
PIF_VALUE: 0
PIF_VALUE: 20
PIF_VALUE: 17
PIF_VALUE: 20
PIF_VALUE: 20
PIF_VALUE: 19
PIF_VALUE: 20
PIF_VALUE: 17
PIF_VALUE: 24
PIF_VALUE: 20
PIF_VALUE: 22
PIF_VALUE: 19
PIF_VALUE: 20
PIF_VALUE: 3
PIF_VALUE: 20
PIF_VALUE: 20
PIF_VALUE: 21
PIF_VALUE: 20
PIF_VALUE: 19
PIF_VALUE: 0
PIF_VALUE: 21
PIF_VALUE: 20
PIF_VALUE: 20
PIF_VALUE: 18
PIF_VALUE: 20
PIF_VALUE: 21
PIF_VALUE: 21
PIF_VALUE: 20
PIF_VALUE: 19
PIF_VALUE: 20
PIF_VALUE: 1
PIF_VALUE: 19
PIF_VALUE: 20
PIF_VALUE: 19
PIF_VALUE: 20
PIF_VALUE: 20
PIF_VALUE: 21
PIF_VALUE: 20
PIF_VALUE: 19
PIF_VALUE: 18
PIF_VALUE: 17
PIF_VALUE: 21
PIF_VALUE: 21
PIF_VALUE: 20
PIF_VALUE: 21
PIF_VALUE: 20
PIF_VALUE: 19
PIF_VALUE: 1
PIF_VALUE: 20
PIF_VALUE: 19
PIF_VALUE: 20
PIF_VALUE: 21
PIF_VALUE: 17
PIF_VALUE: 21
PIF_VALUE: 20
PIF_VALUE: 21
PIF_VALUE: 21
PIF_VALUE: 20
PIF_VALUE: 5
PIF_VALUE: 15
PIF_VALUE: 20
PIF_VALUE: 0
PIF_VALUE: 20
PIF_VALUE: 18
PIF_VALUE: 20
PIF_VALUE: 19
PIF_VALUE: 17
PIF_VALUE: 20
PIF_VALUE: 17
PIF_VALUE: 20
PIF_VALUE: 20
PIF_VALUE: 21
PIF_VALUE: 20
PIF_VALUE: 21
PIF_VALUE: 20
PIF_VALUE: 21
PIF_VALUE: 10
PIF_VALUE: 20
PIF_VALUE: 19
PIF_VALUE: 20
PIF_VALUE: 9
PIF_VALUE: 21
PIF_VALUE: 20
PIF_VALUE: 20
PIF_VALUE: 1
PIF_VALUE: 20
PIF_VALUE: 5
PIF_VALUE: 22

## 2022-01-10 ASSESSMENT — PAIN SCALES - GENERAL
PAINLEVEL_OUTOF10: 9
PAINLEVEL_OUTOF10: 7
PAINLEVEL_OUTOF10: 0
PAINLEVEL_OUTOF10: 9
PAINLEVEL_OUTOF10: 0
PAINLEVEL_OUTOF10: 8
PAINLEVEL_OUTOF10: 10
PAINLEVEL_OUTOF10: 8
PAINLEVEL_OUTOF10: 8
PAINLEVEL_OUTOF10: 7
PAINLEVEL_OUTOF10: 6
PAINLEVEL_OUTOF10: 8
PAINLEVEL_OUTOF10: 6
PAINLEVEL_OUTOF10: 10
PAINLEVEL_OUTOF10: 6
PAINLEVEL_OUTOF10: 6
PAINLEVEL_OUTOF10: 8
PAINLEVEL_OUTOF10: 8
PAINLEVEL_OUTOF10: 9
PAINLEVEL_OUTOF10: 7
PAINLEVEL_OUTOF10: 6
PAINLEVEL_OUTOF10: 8

## 2022-01-10 ASSESSMENT — PAIN - FUNCTIONAL ASSESSMENT: PAIN_FUNCTIONAL_ASSESSMENT: 0-10

## 2022-01-10 ASSESSMENT — PAIN DESCRIPTION - DESCRIPTORS
DESCRIPTORS: ACHING
DESCRIPTORS: ACHING;DISCOMFORT

## 2022-01-10 ASSESSMENT — PAIN DESCRIPTION - LOCATION
LOCATION: ABDOMEN

## 2022-01-10 ASSESSMENT — PAIN DESCRIPTION - PAIN TYPE
TYPE: ACUTE PAIN;SURGICAL PAIN
TYPE: ACUTE PAIN;SURGICAL PAIN
TYPE: ACUTE PAIN

## 2022-01-10 ASSESSMENT — PAIN DESCRIPTION - ORIENTATION: ORIENTATION: LOWER

## 2022-01-10 NOTE — H&P
[address] [address]  History and Physicial      Patient:  Lou Parson  YOB: 1980  MRN: [id number]     Acct: [de-identified]    HISTORY OF PRESENT ILLNESS:     Lou Parson is a 35 y.o. female  with menorrhagia, pelvic pain, fibroid uterus. Obstetric History: # 1 - Date: None, Sex: None, Weight: None, GA: None, Delivery: None, Apgar1: None, Apgar5: None, Living: None, Birth Comments: None    # 2 - Date: None, Sex: None, Weight: None, GA: None, Delivery: None, Apgar1: None, Apgar5: None, Living: None, Birth Comments: None      Past Medical History:        Diagnosis Date    Concussion     Conversion disorder with weakness or paralysis, acute episode     Expressive aphasia     Pneumonia        Past Surgical History:        Procedure Laterality Date    TUBAL LIGATION         Medications: Scheduled Meds:   sodium chloride flush  10 mL IntraVENous 2 times per day    ceFAZolin (ANCEF) IVPB  2,000 mg IntraVENous On Call to OR     Continuous Infusions:   sodium chloride 125 mL/hr at 01/10/22 1053    sodium chloride       PRN Meds:.sodium chloride flush, sodium chloride, labetalol, morphine, fentanNYL    Allergies:  Latex, Seasonal, and Adhesive tape    Social History:    reports that she has never smoked. She has never used smokeless tobacco. She reports current alcohol use. She reports that she does not use drugs.     Family History:       Problem Relation Age of Onset    High Blood Pressure Mother     High Cholesterol Mother     High Blood Pressure Father     Kidney Disease Father     Cancer Father     Cancer Maternal Grandmother     Cancer Maternal Grandfather     Depression Maternal Grandfather     Cancer Paternal Grandmother     Cancer Paternal Grandfather     Early Death Other     Heart Disease Other     High Blood Pressure Sister     Other Sister         seizures       Review of Systems:  General ROS: negative  Respiratory ROS: negative  Cardiovascular ROS: negative  Gastrointestinal ROS: negative  Musculoskeletal ROS: negative  Neurological ROS: negative    Physical Exam:    Vitals:  Patient Vitals for the past 24 hrs:   BP Temp Temp src Pulse Resp SpO2 Height Weight   01/10/22 1007 (!) 141/97 96.4 °F (35.8 °C) Temporal 86 16 99 % 5' 4\" (1.626 m) 210 lb (95.3 kg)          Wt Readings from Last 1 Encounters:   01/10/22 210 lb (95.3 kg)         General appearance - alert, well appearing, and in no distress  Abdomen - deferred  Pelvic - deferred  Neurological - alert, oriented, normal speech, no focal findings or movement disorder noted  Musculoskeletal - no joint tenderness, deformity or swelling  Extremities - peripheral pulses normal, no pedal edema, no clubbing or cyanosis  Skin - normal coloration and turgor, no rashes, no suspicious skin lesions noted      Review of Labs and Diagnostic Testing:      CBC:   Lab Results   Component Value Date    WBC 5.6 01/10/2022    RBC 4.27 01/10/2022    HGB 13.3 01/10/2022    HCT 39.6 01/10/2022    MCV 92.7 01/10/2022    RDW 13.6 11/05/2019     01/10/2022         Radiology:        Assessment:    Patient Active Problem List   Diagnosis    Hypokalemia    Cerebral concussion    Concussion    Conversion disorder with weakness or paralysis, acute episode    Expressive aphasia    TBI [facility]) ([facility] [address]    Dysarthria    Dizziness    Anxiety    SVT (supraventricular tachycardia) (HCC)    S/P MARVIN (total abdominal hysterectomy)         Plan: 1. Plan is for MARVIN BS with possible SUZETTE. See office H&P for full discussion.       Electronically signed by Prasanna Kearney MD on 1/10/2022 at 11:17 AM

## 2022-01-10 NOTE — PROGRESS NOTES
Pt admitted to [facility] room 6 and oriented to unit. SCD sleeves applied. Nares swabbed. Pt verbalized permission for first name, last initial and physicians name on white board. SDS board and discharge criteria explained, pt and family verbalized understanding. Pt denies thoughts of harming self or others. Call light in reach. Family at the bedside.

## 2022-01-10 NOTE — ANESTHESIA PRE PROCEDURE
Department of Anesthesiology  Preprocedure Note       Name:  Liz Miguel   Age:  35 y.o.  :  1980                                          MRN:  [id number]         Date:  1/10/2022      Surgeon: Sandeep Bae):  Yolie Sutherland MD    Procedure: Procedure(s):  TOTAL ABDOMINAL HYSTERECTOMY WITH BILATERAL SALPINGECTOMY POSS BILATERAL OOPHORECTOMY    Medications prior to admission:   Prior to Admission medications    Medication Sig Start Date End Date Taking? Authorizing Provider   Loratadine (CLARITIN PO) Take by mouth daily as needed    Yes Historical Provider, MD   APPLE CIDER VINEGAR PO Take by mouth daily    Yes Historical Provider, MD   VITAMIN D PO Take by mouth daily    Yes Historical Provider, MD   OLIVE LEAF PO Take by mouth daily    Yes Historical Provider, MD   Multiple Vitamins-Minerals (MULTIVITAMIN ADULT EXTRA C PO) Take by mouth daily    Yes Historical Provider, MD   ibuprofen (ADVIL) 200 MG tablet Take 2 tablets by mouth every 8 hours as needed for Pain 21   Radha Caputo, APRN - CNP   acetaminophen (TYLENOL) 500 MG tablet Take by mouth every 6 hours as needed for Pain 2 tab    Historical Provider, MD       Current medications:    Current Facility-Administered Medications   Medication Dose Route Frequency Provider Last Rate Last Admin    0.9 % sodium chloride infusion   IntraVENous Continuous Yolie Sutherland MD        sodium chloride flush 0.9 % injection 10 mL  10 mL IntraVENous 2 times per day Yolie Sutherland MD        sodium chloride flush 0.9 % injection 10 mL  10 mL IntraVENous PRN Yolie Sutherland MD        0.9 % sodium chloride infusion  25 mL IntraVENous PRN Yolie Sutherland MD        ceFAZolin (ANCEF) 2000 mg in dextrose 5 % 50 mL IVPB  2,000 mg IntraVENous On Call to Alicia Ware MD           Allergies:     Allergies   Allergen Reactions    Latex Rash    Seasonal     Adhesive Tape Rash       Problem List:    Patient Active Problem List   Diagnosis Code    Hypokalemia E87.6    Cerebral concussion S06. 0X9A    Concussion S06. 0X7A    Conversion disorder with weakness or paralysis, acute episode F44.4    Expressive aphasia R47.01    TBI (traumatic brain injury) ([facility] [address] S06. 9X9A    Dysarthria R47.1    Dizziness R42    Anxiety F41.9    SVT [facility]supraventricular tachycardia) ([facility] I47.1       Past Medical History:        Diagnosis Date    Concussion     Conversion disorder with weakness or paralysis, acute episode     Expressive aphasia     Pneumonia        Past Surgical History:        Procedure Laterality Date    TUBAL LIGATION         Social History:    Social History     Tobacco Use    Smoking status: Never Smoker    Smokeless tobacco: Never Used   Substance Use Topics    Alcohol use: Yes     Comment: occasionally                                Counseling given: Not Answered      Vital Signs (Current):   Vitals:    01/03/22 1538 01/10/22 1007   BP:  (!) 141/97   Pulse:  86   Resp:  16   Temp:  96.4 °F (35.8 °C)   TempSrc:  Temporal   SpO2:  99%   Weight: 200 lb (90.7 kg) 210 lb (95.3 kg)   Height: 5' 4\" (1.626 m) 5' 4\" (1.626 m)                                              BP Readings from Last 3 Encounters:   01/10/22 (!) 141/97   09/21/21 122/70   08/23/21 (!) 143/85       NPO Status:                                                                                 BMI:   Wt Readings from Last 3 Encounters:   01/10/22 210 lb (95.3 kg)   09/21/21 207 lb 4.8 oz (94 kg)   08/23/21 208 lb (94.3 kg)     Body mass index is 36.05 kg/m².     CBC:   Lab Results   Component Value Date    WBC 5.6 01/10/2022    RBC 4.27 01/10/2022    HGB 13.3 01/10/2022    HCT 39.6 01/10/2022    MCV 92.7 01/10/2022    RDW 13.6 11/05/2019     01/10/2022       CMP:   Lab Results   Component Value Date     11/05/2019     10/31/2019    K 3.8 12/08/2021    K 3.8 10/31/2019     10/31/2019    CO2 20 10/31/2019    BUN 21 10/31/2019    CREATININE 0.8 10/31/2019    LABGLOM >90 10/31/2019    GLUCOSE 94 10/05/2020    PROT 8.1 10/31/2019    CALCIUM 9.6 10/31/2019    BILITOT 0.8 10/31/2019    ALKPHOS 67 10/31/2019    AST 31 10/31/2019    ALT 26 10/31/2019       POC Tests: No results for input(s): POCGLU, POCNA, POCK, POCCL, POCBUN, POCHEMO, POCHCT in the last 72 hours. Coags:   Lab Results   Component Value Date    INR 1.01 05/13/2014       HCG (If Applicable):   Lab Results   Component Value Date    PREGTESTUR negative 01/10/2022    PREGSERUM NEGATIVE 06/14/2018        ABGs: No results found for: PHART, PO2ART, RPN9BXK, BZP9JWQ, BEART, A5FYXXDI     Type & Screen (If Applicable):  No results found for: LABABO, LABRH    Drug/Infectious Status (If Applicable):  No results found for: HIV, HEPCAB    COVID-19 Screening (If Applicable): No results found for: COVID19        Anesthesia Evaluation    Airway: Mallampati: II  TM distance: >3 FB   Neck ROM: full  Mouth opening: > = 3 FB Dental:          Pulmonary: breath sounds clear to auscultation                             Cardiovascular:            Rhythm: regular                      Neuro/Psych:   (+) psychiatric history:            GI/Hepatic/Renal:             Endo/Other:                     Abdominal:   (+) obese,           Vascular: Other Findings:             Anesthesia Plan      general     ASA 2       Induction: intravenous. MIPS: Postoperative opioids intended and Prophylactic antiemetics administered. Anesthetic plan and risks discussed with patient and mother. Use of blood products discussed with mother whom consented to blood products. Plan discussed with CRNA.                   Ruy Capps MD   1/10/2022

## 2022-01-10 NOTE — ANESTHESIA POSTPROCEDURE EVALUATION
Department of Anesthesiology  Postprocedure Note    Patient: Marisabel Hodges  MRN: [id number]  YOB: 1980  Date of evaluation: 1/10/2022  Time:  2:39 PM     Procedure Summary     Date: 01/10/22 Room / Location: [address] JOSE ALBERTO Hinojosa    Anesthesia Start: 5378 Anesthesia Stop: 7236    Procedure: TOTAL ABDOMINAL HYSTERECTOMY WITH BILATERAL SALPINGECTOMY POSS BILATERAL OOPHORECTOMY (N/A ) Diagnosis: (PELVIC PAIN, FIBROID)    Surgeons: Deborah Deluna MD Responsible Provider: Tata Holguin MD    Anesthesia Type: general ASA Status: 2          Anesthesia Type: general    Chiquita Phase I: Chiquita Score: 8    Chiquita Phase II:      Last vitals: Reviewed and per EMR flowsheets.        Anesthesia Post Evaluation    Patient location during evaluation: PACU  Patient participation: complete - patient participated  Level of consciousness: awake  Airway patency: patent  Nausea & Vomiting: no vomiting and no nausea  Complications: no  Cardiovascular status: hemodynamically stable and hypertensive  Respiratory status: acceptable  Hydration status: stable

## 2022-01-10 NOTE — BRIEF OP NOTE
Brief Postoperative Note      Patient: Robert Cortez  YOB: 1980  MRN: [id number]    Date of Procedure: 1/10/2022    Pre-Op Diagnosis: PELVIC PAIN, FIBROID, Menorrhagia    Post-Op Diagnosis: Same       Procedures:  MARVIN BS    Surgeon(s):  Raman Ceja MD     Assist:  Arpita Buitrago    Assistant:  Surgical Assistant: Torrey Hearn    Anesthesia: General    Estimated Blood Loss (mL): 782MC    Complications: None    Specimens:   ID Type Source Tests Collected by Time Destination   A : UTERUS AND BILAT.  TUBES Tissue Abdomen SURGICAL PATHOLOGY Raman Ceja MD 1/10/2022 1230        Implants:  * No implants in log *      Drains:   Urethral Catheter (Active)       Findings: Large fibroid uterus, [id number]    Electronically signed by Raman Ceja MD on 1/10/2022 at 2:09 PM

## 2022-01-10 NOTE — PROGRESS NOTES
1417 Pt arrives to recovery responsive to verbal stimulation. Pt respirations are even and unlabored on 6 L simple mask with oral airway in place. Oral airway removed on arrival. Pt states her pain is a 7/10. Pt VSS  1420 Pt medicated for 7/10 pain with fentanyl at this time. 1 Pt states her pain is now a 9/10. Pt medicated with fentanyl. Pt respirations are even and unlabored on room air. VSS. PT resting with eyes closed   1436 Pt remains to rest with eyes closed and is drowsy. Pt states pain remains a 9/10. Pt medicated with dilaudid. Respirations unlabored. VSS  1441 Pt states her pain is now a 8.5/10 Pt medicated with dilaudid at this time. 26 Pt states her pain is now a 8/10. Pt medicated with fentanyl. Pt remains drowsy and resting with eyes closed. Pt VSS. Respirations unlabored. 5 Pt states her pain is now a 6/10 and tolerable. Pt medicated for blood pressure at this time. 1518 Pt blood pressure remains above 314 systolic. Pt resting with eyes closed. VSS. Respirations are unlabored. 1535 Pt blood pressure remains over 632 systolic. Pt medicated or blood pressure. Pt respirations are even and unlabored. VSS Pt drowsy and states her pain remains a 6/10.   1545 Pt blood pressure has decreased below 865 systolic. Respirations remain unlabored. VSS. Resting with eyes closed   1555 Pt meets criteria for discharge from recovery at this time.

## 2022-01-11 LAB
BASOPHILS # BLD: 0 %
BASOPHILS ABSOLUTE: 0 THOU/MM3 (ref 0–0.1)
EOSINOPHIL # BLD: 0 %
EOSINOPHILS ABSOLUTE: 0 THOU/MM3 (ref 0–0.4)
ERYTHROCYTE [DISTWIDTH] IN BLOOD BY AUTOMATED COUNT: 12.4 % (ref 11.5–14.5)
ERYTHROCYTE [DISTWIDTH] IN BLOOD BY AUTOMATED COUNT: 42.6 FL (ref 35–45)
HCT VFR BLD CALC: 34.1 % (ref 37–47)
HEMOGLOBIN: 11.4 GM/DL (ref 12–16)
IMMATURE GRANS (ABS): 0.02 THOU/MM3 (ref 0–0.07)
IMMATURE GRANULOCYTES: 0.3 %
LYMPHOCYTES # BLD: 16.1 %
LYMPHOCYTES ABSOLUTE: 1.2 THOU/MM3 (ref 1–4.8)
MCH RBC QN AUTO: 31.7 PG (ref 26–33)
MCHC RBC AUTO-ENTMCNC: 33.4 GM/DL (ref 32.2–35.5)
MCV RBC AUTO: 94.7 FL (ref 81–99)
MONOCYTES # BLD: 11.5 %
MONOCYTES ABSOLUTE: 0.8 THOU/MM3 (ref 0.4–1.3)
NUCLEATED RED BLOOD CELLS: 0 /100 WBC
PLATELET # BLD: 335 THOU/MM3 (ref 130–400)
PMV BLD AUTO: 9.5 FL (ref 9.4–12.4)
RBC # BLD: 3.6 MILL/MM3 (ref 4.2–5.4)
SEG NEUTROPHILS: 72.1 %
SEGMENTED NEUTROPHILS ABSOLUTE COUNT: 5.2 THOU/MM3 (ref 1.8–7.7)
WBC # BLD: 7.2 THOU/MM3 (ref 4.8–10.8)

## 2022-01-11 PROCEDURE — 6370000000 HC RX 637 (ALT 250 FOR IP): Performed by: OBSTETRICS & GYNECOLOGY

## 2022-01-11 PROCEDURE — 6360000002 HC RX W HCPCS: Performed by: OBSTETRICS & GYNECOLOGY

## 2022-01-11 PROCEDURE — 36415 COLL VENOUS BLD VENIPUNCTURE: CPT

## 2022-01-11 PROCEDURE — 1200000000 HC SEMI PRIVATE

## 2022-01-11 PROCEDURE — 2580000003 HC RX 258: Performed by: OBSTETRICS & GYNECOLOGY

## 2022-01-11 PROCEDURE — 85025 COMPLETE CBC W/AUTO DIFF WBC: CPT

## 2022-01-11 RX ORDER — DOCUSATE SODIUM 100 MG/1
CAPSULE, LIQUID FILLED ORAL
Status: DISPENSED
Start: 2022-01-11 | End: 2022-01-12

## 2022-01-11 RX ORDER — OXYCODONE HYDROCHLORIDE AND ACETAMINOPHEN 5; 325 MG/1; MG/1
1 TABLET ORAL EVERY 6 HOURS PRN
Qty: 28 TABLET | Refills: 0 | Status: SHIPPED | OUTPATIENT
Start: 2022-01-11 | End: 2022-01-18

## 2022-01-11 RX ORDER — SENNA AND DOCUSATE SODIUM 50; 8.6 MG/1; MG/1
2 TABLET, FILM COATED ORAL DAILY PRN
Status: DISCONTINUED | OUTPATIENT
Start: 2022-01-12 | End: 2022-01-12 | Stop reason: HOSPADM

## 2022-01-11 RX ORDER — IBUPROFEN 800 MG/1
800 TABLET ORAL EVERY 8 HOURS PRN
Qty: 30 TABLET | Refills: 3 | Status: SHIPPED | OUTPATIENT
Start: 2022-01-12

## 2022-01-11 RX ORDER — DOCUSATE SODIUM 100 MG/1
100 CAPSULE, LIQUID FILLED ORAL DAILY
Status: DISCONTINUED | OUTPATIENT
Start: 2022-01-11 | End: 2022-01-12 | Stop reason: HOSPADM

## 2022-01-11 RX ADMIN — KETOROLAC TROMETHAMINE 30 MG: 30 INJECTION, SOLUTION INTRAMUSCULAR; INTRAVENOUS at 01:29

## 2022-01-11 RX ADMIN — OXYCODONE AND ACETAMINOPHEN 2 TABLET: 5; 325 TABLET ORAL at 11:56

## 2022-01-11 RX ADMIN — DOCUSATE SODIUM 100 MG: 100 CAPSULE, LIQUID FILLED ORAL at 20:29

## 2022-01-11 RX ADMIN — SODIUM CHLORIDE, PRESERVATIVE FREE 10 ML: 5 INJECTION INTRAVENOUS at 20:00

## 2022-01-11 RX ADMIN — OXYCODONE AND ACETAMINOPHEN 2 TABLET: 5; 325 TABLET ORAL at 02:32

## 2022-01-11 RX ADMIN — KETOROLAC TROMETHAMINE 30 MG: 30 INJECTION, SOLUTION INTRAMUSCULAR; INTRAVENOUS at 14:00

## 2022-01-11 RX ADMIN — KETOROLAC TROMETHAMINE 30 MG: 30 INJECTION, SOLUTION INTRAMUSCULAR; INTRAVENOUS at 07:28

## 2022-01-11 RX ADMIN — SODIUM CHLORIDE: 9 INJECTION, SOLUTION INTRAVENOUS at 01:35

## 2022-01-11 RX ADMIN — KETOROLAC TROMETHAMINE 30 MG: 30 INJECTION, SOLUTION INTRAMUSCULAR; INTRAVENOUS at 20:00

## 2022-01-11 RX ADMIN — OXYCODONE AND ACETAMINOPHEN 2 TABLET: 5; 325 TABLET ORAL at 17:27

## 2022-01-11 RX ADMIN — OXYCODONE AND ACETAMINOPHEN 2 TABLET: 5; 325 TABLET ORAL at 07:28

## 2022-01-11 ASSESSMENT — PAIN SCALES - GENERAL
PAINLEVEL_OUTOF10: 7
PAINLEVEL_OUTOF10: 8
PAINLEVEL_OUTOF10: 7
PAINLEVEL_OUTOF10: 0
PAINLEVEL_OUTOF10: 8
PAINLEVEL_OUTOF10: 7
PAINLEVEL_OUTOF10: 8
PAINLEVEL_OUTOF10: 8

## 2022-01-11 ASSESSMENT — PAIN DESCRIPTION - PAIN TYPE
TYPE: SURGICAL PAIN
TYPE: SURGICAL PAIN
TYPE: ACUTE PAIN;SURGICAL PAIN
TYPE: SURGICAL PAIN

## 2022-01-11 ASSESSMENT — PAIN DESCRIPTION - DESCRIPTORS
DESCRIPTORS: ACHING;DISCOMFORT
DESCRIPTORS: ACHING

## 2022-01-11 ASSESSMENT — PAIN DESCRIPTION - LOCATION
LOCATION: ABDOMEN

## 2022-01-11 NOTE — PROGRESS NOTES
**This is a Medical/ PA/ APRN Student Note and is charted for educational purposes. The non-physician staff attested note is not to be used for billing purposes or to guide patient care. Please see the physician modifications/ attestation for treatment plan/suggestions. This note has been reviewed and feedback has been provided to the student. *     Department of Obstetrics and Gynecology  Post Op Progress Note      SUBJECTIVE:  POD 1    OBJECTIVE: 34 yo female with a PMHx of menorrhagia and uterine fibroids is one day s/p MARVIN BS. Pt reports feeling fairly well. She is not nauseated. Her pain is moderately controlled, but reports significant discomfort. Reports passing gas, but denies a BM. Pt had some elevated blood pressures last night, but denies blood pressure issues prior to surgery. No complaints. VITALS:/80   Pulse 102   Temp 97.2 °F (36.2 °C) (Oral)   Resp 19   Ht 5' 4\" (1.626 m)   Wt 210 lb (95.3 kg)   SpO2 98%   BMI 36.05 kg/m²     Urine output: In: -   Out: 550 [Urine:550]    ABDOMEN:  normal bowel sounds, soft, non-distended  INCISION:covered    DATA:    CBC:    Lab Results   Component Value Date    HGB 11.4 (L) 01/11/2022    HCT 34.1 (L) 01/11/2022     General Labs:    CBC:   Lab Results   Component Value Date    WBC 7.2 01/11/2022    RBC 3.60 01/11/2022    HGB 11.4 01/11/2022    HCT 34.1 01/11/2022    MCV 94.7 01/11/2022    MCH 31.7 01/11/2022    MCHC 33.4 01/11/2022    RDW 13.6 11/05/2019     01/11/2022    MPV 9.5 01/11/2022         ASSESSMENT & PLAN:    1. POD 1 from an open total abdominal hysterectomy and bilateral salpingectomy. Plan:  1. Remove IV  2. Advance diet  3. PO pain meds PRN  4.  Abdominal Minerva Ser Mangen 1/11/2022 8:11 AM

## 2022-01-11 NOTE — DISCHARGE SUMMARY
GYN Surgery  Discharge Summary     Patient ID:  Tomas Amaral  [id number]  29 y.o.  1980    Admit date: 1/10/2022    Admitting Physician: Braulio Santiago MD    Discharge Diagnoses: S/P MARVIN (total abdominal hysterectomy) [Z90.710]    Discharged Condition: good      Procedures Performed: [address] Course: Patient was admitted on the day of surgery, and underwent an uncomplicated procedure. See dictated op note. Disposition: home    Patient Instructions: Activity: activity as tolerated, no sex for 6 weeks, no driving while on analgesics and no heavy lifting for 6 weeks  Diet: regular  Wound Care: as directed    Discharge Medication:      Medication List      START taking these medications    oxyCODONE-acetaminophen 5-325 MG per tablet  Commonly known as: PERCOCET  Take 1 tablet by mouth every 6 hours as needed for Pain for up to 7 days. CHANGE how you take these medications    ibuprofen 800 MG tablet  Commonly known as: ADVIL;MOTRIN  Take 1 tablet by mouth every 8 hours as needed for Pain  Start taking on: January 12, 2022  What changed:   · medication strength  · how much to take  · These instructions start on January 12, 2022. If you are unsure what to do until then, ask your doctor or other care provider.         CONTINUE taking these medications    acetaminophen 500 MG tablet  Commonly known as: TYLENOL     APPLE CIDER VINEGAR PO     CLARITIN PO     MULTIVITAMIN ADULT EXTRA C PO     OLIVE LEAF PO     VITAMIN D PO           Where to Get Your Medications      These medications were sent to [facility] [address], [address] [place] [address], [address] [place] [address]    Phone: [phone]   · ibuprofen 800 MG tablet  · oxyCODONE-acetaminophen 5-325 MG per tablet          Discharge Date: 1/12/22    Condition: Good    Follow-up with Braulio Santiago MD in 1 week    Signed:  Electronically signed by Braulio Santiago MD on 1/11/2022 at 9:09 AM

## 2022-01-11 NOTE — OP NOTE
[address] [place] [address]                                OPERATIVE REPORT    PATIENT NAME: Magdi Argueta                 :        1980  MED REC NO:   [id number]                           ROOM:       0010  ACCOUNT NO:   [de-identified]                           ADMIT DATE: 01/10/2022  PROVIDER:     Norina Apley, M.D.    DATE OF PROCEDURE:  01/10/2022    PREOPERATIVE DIAGNOSES:  1.  Pelvic pain. 2.  Fibroid. 3.  Menorrhagia. POSTOPERATIVE DIAGNOSES:  1.  Pelvic pain. 2.  Fibroid. 3.  Menorrhagia. OPERATION PERFORMED:  Total abdominal hysterectomy and bilateral  salpingectomy. SURGEON:  Norina Apley, MD    ASSISTANT:  Perfecto Hawkins MD    ANESTHESIA:  General.    EBL:  400 mL. COMPLICATIONS:  None. SPECIMENS:  Uterus and bilateral tubes. FINDINGS:  A large fibroid uterus. The weight was 441 gm. OPERATIVE PROCEDURE:  The patient was taken back to the operating room  where general anesthesia was obtained without difficulty. She was then  placed in the dorsal lithotomy position and prepared and draped in a  normal sterile fashion. Her Sousa catheter was placed at that time. A  vertical skin incision was made and brought down to the underlying layer  of fascia. The layer of fascia was nicked in the midline and extended  superiorly and inferiorly using a hemostat as well as Bovie cautery. First starting on the right side, the fascia was elevated and the rectus  muscle was dissected off; however, it was not felt to be at midline. So, then grasping the fascia on the left side with Kocher clamps and  elevating it, the rectus muscles were dissected off and midline was  identified. Entry was gained into the abdominal cavity by grasping the  peritoneum, elevating it, and using Metzenbaum scissors to gain entry  into the abdominal cavity.   That incision was extended superiorly and  inferiorly bluntly. Inspection of the pelvis revealed a large fibroid  uterus, no significant adhesions. A Bookwalter retractor was then  placed without difficulty. A _____ was then placed into the abdomen in  order to _____ the bowel out of the way and a bladder blade was placed  as well as additional retractors in order to aid in visualization. Kocher clamps were placed on both round ligaments. On the right side,  the round ligament was grasped with two Sera clamps, cut, and suture  ligated. The anterior leaf of the broad ligament was incised and  brought across the anterior uterus in order to start to create the  bladder flap. The posterior leaf of the broad ligament was incised as  well. The uterine ovarian ligament was identified and a window was  created in order to doubly clamp the utero-ovarian ligament. This was  then cut and suture ligated as well as free tied. Adequate hemostasis  was noted at that time. In a similar manner on the left side, the round ligament was suture  ligated and cut. The anterior leaf of the broad ligament was incised in  order to create the bladder flap. This was brought adjacent to the  incision made on the other side and the bladder was dissected off the  anterior portion of the uterus. The posterior leaf of the broad  ligament was incised. The uterine ovarian ligament was identified. A  window was created in order to doubly clamp it, cut, suture ligate as  well as free tie it and adequate hemostasis was noted at that time. So,  with that, the uterine artery was skeletonized on each side. This was  then cross clamped, cut, and suture ligated on the right side followed  by the left side and then on the right side, the cardinal ligament was  cross clamped, cut, and suture ligated. On the left side, the cardinal  ligament was cross clamped, cut, and suture ligated.   The uterosacral  ligament was then cross clamped, cut, and suture ligated on the right  side and then on the left side. Each of the uterosacral ligaments was  tagged. At this point, it was noted that we were at the level of the cervix. The bladder was again dissected off the anterior portion of the uterus  and then, using curved Sera clamps coming across just below the level  of the cervix and then using Ciaran scissors, the uterus and cervix  were able to be removed in their entirety. There was a small portion of  bleeding anteriorly where the uterus was as well as along the vaginal  cuff. So, with that, the cuff angles were tied. The vaginal colpotomy incision was grasped using Allis clamps and  interrupted figure-of-eight sutures in order to obtain hemostasis along  the vaginal cuff. There was still a little bit of bleeding on the  anterior portion of this where the bladder was. This was improved using  cautery as well as a 4-0 Vicryl stitch. Each uterine ovarian ligament  pedicle was inspected. There was some mild bleeding on each side. So,  this was then suture ligated which resolved any bleeding issues. The  pelvis was then copiously irrigated with no continued bleeding along the  cuff. Surgicel powder was placed to aid in hemostasis. So, with that,  all instruments were then removed from the patient. Peritoneum was  closed using 2-0 Vicryl. Fascia was closed using looped 0 PDS starting  superiorly and running inferiorly and then inferiorly going superiorly  until the two sutures were adjacent to each other. Each of those were  tied individually and then tied together as well. The subcutaneous  tissue was closed using 2-0 Vicryl in an interrupted manner and the skin  incision was closed using staples. She was then awakened from general  anesthesia. Sponge, lap, and needle counts were correct x2. She was  taken to the recovery room in stable condition.         Bridgett Branch M.D.    D: 01/10/2022 15:28:12       T: 01/10/2022 21:34:25     [email or web address]  Job#: [id number]     Doc#: [id number]    CC:

## 2022-01-11 NOTE — CARE COORDINATION
1/11/22, 9:34 AM EST  DISCHARGE PLANNING EVALUATION:    Lencho Klein       Admitted: 1/10/2022/ 2886   Hospital day: 1   Location: [facility]10/010-A Reason for admit: S/P MARVIN (total abdominal hysterectomy) [Z90.710]   PMH:  has a past medical history of Concussion, Conversion disorder with weakness or paralysis, acute episode, Expressive aphasia, and Pneumonia. Procedure:   1/10 Total abdominal hysterectomy and bilateral salpingectomy. Barriers to Discharge:  Here for elective procedure. POD #1. IVF. Toradol iv q6hr. Pain control. PCP: RONIT Nair CNP  Readmission Risk Score: 4.7 ( )%    Patient Goals/Plan/Treatment Preferences: Spoke with pt and mom. Pt lives at home with her fiance and her daughter. She is independent and drives, she works full time. Denies any DME or HH needs. Verified pt's PCP and insurance. Transportation/Food Security/Housekeeping Addressed:  No issues identified.

## 2022-01-12 VITALS
DIASTOLIC BLOOD PRESSURE: 88 MMHG | OXYGEN SATURATION: 96 % | TEMPERATURE: 98.7 F | HEIGHT: 64 IN | HEART RATE: 106 BPM | WEIGHT: 210 LBS | SYSTOLIC BLOOD PRESSURE: 133 MMHG | RESPIRATION RATE: 18 BRPM | BODY MASS INDEX: 35.85 KG/M2

## 2022-01-12 PROCEDURE — 6370000000 HC RX 637 (ALT 250 FOR IP): Performed by: OBSTETRICS & GYNECOLOGY

## 2022-01-12 PROCEDURE — 6360000002 HC RX W HCPCS: Performed by: OBSTETRICS & GYNECOLOGY

## 2022-01-12 RX ADMIN — DOCUSATE SODIUM 100 MG: 100 CAPSULE, LIQUID FILLED ORAL at 08:04

## 2022-01-12 RX ADMIN — OXYCODONE AND ACETAMINOPHEN 2 TABLET: 5; 325 TABLET ORAL at 08:32

## 2022-01-12 RX ADMIN — KETOROLAC TROMETHAMINE 30 MG: 30 INJECTION, SOLUTION INTRAMUSCULAR; INTRAVENOUS at 08:01

## 2022-01-12 RX ADMIN — KETOROLAC TROMETHAMINE 30 MG: 30 INJECTION, SOLUTION INTRAMUSCULAR; INTRAVENOUS at 01:59

## 2022-01-12 ASSESSMENT — PAIN SCALES - GENERAL
PAINLEVEL_OUTOF10: 7
PAINLEVEL_OUTOF10: 7
PAINLEVEL_OUTOF10: 8

## 2022-01-12 NOTE — PROGRESS NOTES
Department of Obstetrics and Gynecology  Attending Post Op Progress Note      SUBJECTIVE:  POD 2    OBJECTIVE: Feels well. She is not nauseated. Her pain is well controlled. No complaints. VITALS:/74   Pulse 100   Temp 98.7 °F (37.1 °C) (Oral)   Resp 16   Ht 5' 4\" (1.626 m)   Wt 210 lb (95.3 kg)   SpO2 97%   BMI 36.05 kg/m²     ABDOMEN:  normal bowel sounds, soft, non-distended  INCISION:covered    DATA:    CBC:    Lab Results   Component Value Date    HGB 11.4 (L) 01/11/2022    HCT 34.1 (L) 01/11/2022     General Labs:    CBC:   Lab Results   Component Value Date    WBC 7.2 01/11/2022    RBC 3.60 01/11/2022    HGB 11.4 01/11/2022    HCT 34.1 01/11/2022    MCV 94.7 01/11/2022    MCH 31.7 01/11/2022    MCHC 33.4 01/11/2022    RDW 13.6 11/05/2019     01/11/2022    MPV 9.5 01/11/2022         ASSESSMENT & PLAN:    POD 1 from [facility]  Pt doing well. No complaints. Is ready to go home.         Navarro Herring MD 1/12/2022 7:31 AM

## 2022-01-12 NOTE — PROGRESS NOTES
1006- Discharge instructions reviewed with patient. Pt verbalizes understanding. Fitted with New Abd binder. Awaiting spouse to . Will request transport once he arrives.

## 2022-01-12 NOTE — CARE COORDINATION
1/12/22, 7:45 AM EST    Patient goals/plan/ treatment preferences discussed by  and . Patient goals/plan/ treatment preferences reviewed with patient/ family. Patient/ family verbalize understanding of discharge plan and are in agreement with goal/plan/treatment preferences. Understanding was demonstrated using the teach back method. AVS provided by RN at time of discharge, which includes all necessary medical information pertaining to the patients current course of illness, treatment, post-discharge goals of care, and treatment preferences. Planning home with family today. No needs.      Electronically signed by Melissa Price RN on 1/12/2022 at 7:46 AM

## 2022-01-13 ENCOUNTER — TELEPHONE (OUTPATIENT)
Dept: FAMILY MEDICINE CLINIC | Age: 42
End: 2022-01-13

## 2022-01-13 NOTE — TELEPHONE ENCOUNTER
[facility] Trans[facility] Initial Follow Up Call    Outreach made within 2 business days of discharge: Yes    Patient: Nav Castaneda Patient : 1980   MRN: [id number]  Reason for Admission: There are no discharge diagnoses documented for the most recent discharge. Discharge Date: 22       Spoke with: Pt    Discharge department/facility: [facility]    TCM Interactive Patient Contact:  Was patient able to fill all prescriptions: Yes  Was patient instructed to bring all medications to the follow-up visit: Yes  Is patient taking all medications as directed in the discharge summary?  Yes  Does patient understand their discharge instructions: Yes  Does patient have questions or concerns that need addressed prior to 7-14 day follow up office visit: no    Scheduled appointment with PCP within 7-14 days    Follow Up  Future Appointments   Date Time Provider Tatiana Hancock   2022  9:45 AM Joshua Robbins, [address] ([address] [place] [address])

## 2022-01-18 ENCOUNTER — OFFICE VISIT (OUTPATIENT)
Dept: FAMILY MEDICINE CLINIC | Age: 42
End: 2022-01-18
Payer: COMMERCIAL

## 2022-01-18 VITALS
DIASTOLIC BLOOD PRESSURE: 70 MMHG | RESPIRATION RATE: 12 BRPM | WEIGHT: 212.3 LBS | BODY MASS INDEX: 36.44 KG/M2 | HEART RATE: 80 BPM | SYSTOLIC BLOOD PRESSURE: 122 MMHG

## 2022-01-18 DIAGNOSIS — D25.9 UTERINE LEIOMYOMA, UNSPECIFIED LOCATION: Primary | ICD-10-CM

## 2022-01-18 DIAGNOSIS — Z90.710 S/P TAH (TOTAL ABDOMINAL HYSTERECTOMY): ICD-10-CM

## 2022-01-18 PROCEDURE — 99495 TRANSJ CARE MGMT MOD F2F 14D: CPT | Performed by: NURSE PRACTITIONER

## 2022-01-18 PROCEDURE — 1111F DSCHRG MED/CURRENT MED MERGE: CPT | Performed by: NURSE PRACTITIONER

## 2022-01-18 SDOH — ECONOMIC STABILITY: FOOD INSECURITY: WITHIN THE PAST 12 MONTHS, YOU WORRIED THAT YOUR FOOD WOULD RUN OUT BEFORE YOU GOT MONEY TO BUY MORE.: NEVER TRUE

## 2022-01-18 SDOH — ECONOMIC STABILITY: FOOD INSECURITY: WITHIN THE PAST 12 MONTHS, THE FOOD YOU BOUGHT JUST DIDN'T LAST AND YOU DIDN'T HAVE MONEY TO GET MORE.: NEVER TRUE

## 2022-01-18 ASSESSMENT — PATIENT HEALTH QUESTIONNAIRE - PHQ9
SUM OF ALL RESPONSES TO PHQ QUESTIONS 1-9: 0
SUM OF ALL RESPONSES TO PHQ9 QUESTIONS 1 & 2: 0
SUM OF ALL RESPONSES TO PHQ QUESTIONS 1-9: 0
2. FEELING DOWN, DEPRESSED OR HOPELESS: 0
1. LITTLE INTEREST OR PLEASURE IN DOING THINGS: 0

## 2022-01-18 ASSESSMENT — ENCOUNTER SYMPTOMS
COUGH: 0
CONSTIPATION: 1
NAUSEA: 0
ABDOMINAL PAIN: 0
SHORTNESS OF BREATH: 0

## 2022-01-18 ASSESSMENT — SOCIAL DETERMINANTS OF HEALTH (SDOH): HOW HARD IS IT FOR YOU TO PAY FOR THE VERY BASICS LIKE FOOD, HOUSING, MEDICAL CARE, AND HEATING?: NOT HARD AT ALL

## 2022-01-18 NOTE — PROGRESS NOTES
Post-Discharge Transitional Care Management Services or Hospital Follow Up      [facility] [place]   YOB: 1980    Date of Office Visit:  1/18/2022  Date of Hospital Admission: 1/10/22  Date of Hospital Discharge: 1/12/22  Readmission Risk Score(high >=14%. 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 If you have any questions, ask your nurse or doctor. CONTINUE taking these medications    acetaminophen 500 MG tablet  Commonly known as: TYLENOL     APPLE CIDER VINEGAR PO     CLARITIN PO     ibuprofen 800 MG tablet  Commonly known as: ADVIL;MOTRIN  Take 1 tablet by mouth every 8 hours as needed for Pain     MULTIVITAMIN ADULT EXTRA C PO     OLIVE LEAF PO     oxyCODONE-acetaminophen 5-325 MG per tablet  Commonly known as: PERCOCET  Take 1 tablet by mouth every 6 hours as needed for Pain for up to 7 days.      VITAMIN D PO              Medications marked \"taking\" at this time  Outpatient Medications Marked as Taking for the 1/18/22 encounter (Office Visit) with RONIT Duque CNP   Medication Sig Dispense Refill   Hollie Paul oxyCODONE-acetaminophen (PERCOCET) 5-325 MG per tablet Take 1 tablet by mouth every 6 hours as needed for Pain for up to 7 days. 28 tablet 0    ibuprofen (ADVIL;MOTRIN) 800 MG tablet Take 1 tablet by mouth every 8 hours as needed for Pain 30 tablet 3    Loratadine (CLARITIN PO) Take by mouth daily as needed       APPLE CIDER VINEGAR PO Take by mouth daily       VITAMIN D PO Take by mouth daily       acetaminophen (TYLENOL) 500 MG tablet Take by mouth every 6 hours as needed for Pain 2 tab      OLIVE LEAF PO Take by mouth daily       Multiple Vitamins-Minerals (MULTIVITAMIN ADULT EXTRA C PO) Take by mouth daily           Medications patient taking as of now reconciled against medications ordered at time of hospital discharge: Yes    Chief Complaint   Patient presents with    Follow-Up from Hospital    Constipation     using prune juice every meal, stool softner without relief       HPI    Inpatient course: Discharge summary reviewed- see chart. Admit date: 1/10/2022  Admitting Physician: Ivis Maldonado MD     Discharge Diagnoses: S/P MARVIN (total abdominal hysterectomy) [Z90.710]  Discharged Condition: good        [facility]     Hospital Course: Patient was admitted on the day of surgery, and underwent an uncomplicated procedure. See dictated op note.      Disposition: home     Patient Instructions: Activity: activity as tolerated, no sex for 6 weeks, no driving while on analgesics and no heavy lifting for 6 weeks  Diet: regular  Wound Care: as directed    Urgency to have a bowel movement. Hard stools. Increase fiber with diet since. Glass of Prune Juice x 2 daily. Stool softeners BID. Only taking percocet at night. Appetite good. Sees. Dr. Amador Knight tomorrow 1/19/22 for hospital follow up    Review of Systems   Constitutional: Negative for chills and fever. HENT: Negative. Respiratory: Negative for cough and shortness of breath.     Cardiovascular: Negative for chest pain. Gastrointestinal: Positive for constipation. Negative for abdominal pain and nausea. Skin: Positive for wound. Negative for rash. Neurological: Negative for dizziness, light-headedness and headaches. Psychiatric/Behavioral: Negative. Vitals:    01/18/22 0949   BP: 122/70   Site: Left Upper Arm   Position: Sitting   Cuff Size: Large Adult   Pulse: 80   Resp: 12   Weight: 212 lb 4.8 oz (96.3 kg)     Body mass index is 36.44 kg/m². Wt Readings from Last 3 Encounters:   01/18/22 212 lb 4.8 oz (96.3 kg)   01/10/22 210 lb (95.3 kg)   09/21/21 207 lb 4.8 oz (94 kg)     BP Readings from Last 3 Encounters:   01/18/22 122/70   01/12/22 133/88   01/10/22 130/73       Physical Exam  Constitutional:       General: She is not in acute distress. Eyes:      Pupils: Pupils are equal, round, and reactive to light. Cardiovascular:      Rate and Rhythm: Normal rate and regular rhythm. Heart sounds: No murmur heard. Pulmonary:      Effort: Pulmonary effort is normal.      Breath sounds: Normal breath sounds. No wheezing. Abdominal:      General: Bowel sounds are normal. There is no distension. Palpations: Abdomen is soft. Tenderness: There is no abdominal tenderness. Musculoskeletal:         General: No tenderness. Normal range of motion. Cervical back: Normal range of motion and neck supple. Skin:     General: Skin is warm and dry. Findings: No rash. Assessment/Plan:   Diagnosis Orders   1. Uterine leiomyoma, unspecified location  [place] DISCHARGE MEDS RECONCILED W/ CURRENT OUTPATIENT MED LIST   2. S/P MARVIN (total abdominal hysterectomy)           MDM:  Add on Miralax BID until bowel soften then Daily. Continue Colace. Appetite advancing. Incision no signs of infection. Follow up with OBGYN tomorrow 1/19/22.        Medical Decision Making: moderate complexity

## 2022-03-03 ENCOUNTER — TELEPHONE (OUTPATIENT)
Dept: FAMILY MEDICINE CLINIC | Age: 42
End: 2022-03-03

## 2022-03-03 NOTE — TELEPHONE ENCOUNTER
----- Message from Alysia Crabtree sent at 3/3/2022 12:56 PM EST -----  Subject: Message to Provider    QUESTIONS  Information for Provider? Patient is due for [place] paperwork to be updated,   patient would like to know if she needs to schedule a VV for this?  ---------------------------------------------------------------------------  --------------  CALL BACK INFO  What is the best way for the office to contact you? OK to leave message on   voicemail  Preferred Call Back Phone Number? [phone]  ---------------------------------------------------------------------------  --------------  SCRIPT ANSWERS  Relationship to Patient?  Self

## 2022-05-12 ENCOUNTER — TELEPHONE (OUTPATIENT)
Dept: CARDIOLOGY CLINIC | Age: 42
End: 2022-05-12

## 2022-05-12 ENCOUNTER — HOSPITAL ENCOUNTER (EMERGENCY)
Age: 42
Discharge: HOME | End: 2022-05-12
Attending: EMERGENCY MEDICINE
Payer: COMMERCIAL

## 2022-05-12 ENCOUNTER — TELEPHONE (OUTPATIENT)
Dept: FAMILY MEDICINE CLINIC | Age: 42
End: 2022-05-12

## 2022-05-12 VITALS
TEMPERATURE: 97.8 F | HEIGHT: 64 IN | OXYGEN SATURATION: 98 % | WEIGHT: 210 LBS | HEART RATE: 102 BPM | DIASTOLIC BLOOD PRESSURE: 112 MMHG | SYSTOLIC BLOOD PRESSURE: 146 MMHG | BODY MASS INDEX: 35.85 KG/M2 | RESPIRATION RATE: 16 BRPM

## 2022-05-12 DIAGNOSIS — I47.1 PAROXYSMAL SUPRAVENTRICULAR TACHYCARDIA (HCC): Primary | ICD-10-CM

## 2022-05-12 LAB
ALBUMIN SERPL-MCNC: 4.4 G/DL (ref 3.5–5.1)
ALP BLD-CCNC: 73 U/L (ref 38–126)
ALT SERPL-CCNC: 29 U/L (ref 11–66)
AMPHETAMINE+METHAMPHETAMINE URINE SCREEN: NEGATIVE
ANION GAP SERPL CALCULATED.3IONS-SCNC: 12 MEQ/L (ref 8–16)
APTT: 41.7 SECONDS (ref 22–38)
AST SERPL-CCNC: 31 U/L (ref 5–40)
ATYPICAL LYMPHOCYTES: NORMAL %
BARBITURATE QUANTITATIVE URINE: NEGATIVE
BASOPHILS # BLD: 0.1 %
BASOPHILS ABSOLUTE: 0 THOU/MM3 (ref 0–0.1)
BENZODIAZEPINE QUANTITATIVE URINE: NEGATIVE
BILIRUB SERPL-MCNC: 0.5 MG/DL (ref 0.3–1.2)
BILIRUBIN DIRECT: < 0.2 MG/DL (ref 0–0.3)
BILIRUBIN URINE: NEGATIVE
BLOOD, URINE: NEGATIVE
BUN BLDV-MCNC: 20 MG/DL (ref 7–22)
CALCIUM SERPL-MCNC: 9.4 MG/DL (ref 8.5–10.5)
CANNABINOID QUANTITATIVE URINE: NEGATIVE
CHARACTER, URINE: CLEAR
CHLORIDE BLD-SCNC: 105 MEQ/L (ref 98–111)
CO2: 23 MEQ/L (ref 23–33)
COCAINE METABOLITE QUANTITATIVE URINE: NEGATIVE
COLOR: YELLOW
CREAT SERPL-MCNC: 0.7 MG/DL (ref 0.4–1.2)
EKG ATRIAL RATE: 95 BPM
EKG P AXIS: 43 DEGREES
EKG P-R INTERVAL: 124 MS
EKG Q-T INTERVAL: 228 MS
EKG Q-T INTERVAL: 336 MS
EKG QRS DURATION: 82 MS
EKG QRS DURATION: 90 MS
EKG QTC CALCULATION (BAZETT): 411 MS
EKG QTC CALCULATION (BAZETT): 422 MS
EKG R AXIS: 26 DEGREES
EKG R AXIS: 29 DEGREES
EKG T AXIS: -13 DEGREES
EKG T AXIS: -26 DEGREES
EKG VENTRICULAR RATE: 196 BPM
EKG VENTRICULAR RATE: 95 BPM
EOSINOPHIL # BLD: 1.8 %
EOSINOPHILS ABSOLUTE: 0.1 THOU/MM3 (ref 0–0.4)
ERYTHROCYTE [DISTWIDTH] IN BLOOD BY AUTOMATED COUNT: 12.5 % (ref 11.5–14.5)
ERYTHROCYTE [DISTWIDTH] IN BLOOD BY AUTOMATED COUNT: 42.9 FL (ref 35–45)
ETHYL ALCOHOL, SERUM: < 0.01 %
GFR SERPL CREATININE-BSD FRML MDRD: > 90 ML/MIN/1.73M2
GLUCOSE BLD-MCNC: 126 MG/DL (ref 70–108)
GLUCOSE URINE: NEGATIVE MG/DL
HCT VFR BLD CALC: 42.4 % (ref 37–47)
HEMOGLOBIN: 14.1 GM/DL (ref 12–16)
IMMATURE GRANS (ABS): 0.03 THOU/MM3 (ref 0–0.07)
IMMATURE GRANULOCYTES: 0.4 %
INR BLD: 0.99 (ref 0.85–1.13)
KETONES, URINE: NEGATIVE
LEUKOCYTE ESTERASE, URINE: NEGATIVE
LIPASE: 20.9 U/L (ref 5.6–51.3)
LYMPHOCYTES # BLD: 45.2 %
LYMPHOCYTES ABSOLUTE: 3.5 THOU/MM3 (ref 1–4.8)
MAGNESIUM: 2.3 MG/DL (ref 1.6–2.4)
MCH RBC QN AUTO: 31.2 PG (ref 26–33)
MCHC RBC AUTO-ENTMCNC: 33.3 GM/DL (ref 32.2–35.5)
MCV RBC AUTO: 93.8 FL (ref 81–99)
MONOCYTES # BLD: 6.6 %
MONOCYTES ABSOLUTE: 0.5 THOU/MM3 (ref 0.4–1.3)
NITRITE, URINE: NEGATIVE
NUCLEATED RED BLOOD CELLS: 0 /100 WBC
OPIATES, URINE: NEGATIVE
OSMOLALITY CALCULATION: 283.5 MOSMOL/KG (ref 275–300)
OXYCODONE: NEGATIVE
PH UA: 5.5 (ref 5–9)
PHENCYCLIDINE QUANTITATIVE URINE: NEGATIVE
PLATELET # BLD: 271 THOU/MM3 (ref 130–400)
PMV BLD AUTO: 10.6 FL (ref 9.4–12.4)
POTASSIUM SERPL-SCNC: 3.4 MEQ/L (ref 3.5–5.2)
PRO-BNP: 11.3 PG/ML (ref 0–450)
PROTEIN UA: NEGATIVE
RBC # BLD: 4.52 MILL/MM3 (ref 4.2–5.4)
SCAN OF BLOOD SMEAR: NORMAL
SEG NEUTROPHILS: 45.9 %
SEGMENTED NEUTROPHILS ABSOLUTE COUNT: 3.6 THOU/MM3 (ref 1.8–7.7)
SODIUM BLD-SCNC: 140 MEQ/L (ref 135–145)
SPECIFIC GRAVITY, URINE: 1 (ref 1–1.03)
TOTAL PROTEIN: 7.5 G/DL (ref 6.1–8)
TROPONIN T: < 0.01 NG/ML
UROBILINOGEN, URINE: 0.2 EU/DL (ref 0–1)
WBC # BLD: 7.8 THOU/MM3 (ref 4.8–10.8)

## 2022-05-12 PROCEDURE — 93005 ELECTROCARDIOGRAM TRACING: CPT | Performed by: EMERGENCY MEDICINE

## 2022-05-12 PROCEDURE — 93010 ELECTROCARDIOGRAM REPORT: CPT | Performed by: NUCLEAR MEDICINE

## 2022-05-12 PROCEDURE — 2580000003 HC RX 258: Performed by: EMERGENCY MEDICINE

## 2022-05-12 PROCEDURE — 85730 THROMBOPLASTIN TIME PARTIAL: CPT

## 2022-05-12 PROCEDURE — 85025 COMPLETE CBC W/AUTO DIFF WBC: CPT

## 2022-05-12 PROCEDURE — 80053 COMPREHEN METABOLIC PANEL: CPT

## 2022-05-12 PROCEDURE — 80307 DRUG TEST PRSMV CHEM ANLYZR: CPT

## 2022-05-12 PROCEDURE — 84484 ASSAY OF TROPONIN QUANT: CPT

## 2022-05-12 PROCEDURE — 82248 BILIRUBIN DIRECT: CPT

## 2022-05-12 PROCEDURE — 83735 ASSAY OF MAGNESIUM: CPT

## 2022-05-12 PROCEDURE — 85610 PROTHROMBIN TIME: CPT

## 2022-05-12 PROCEDURE — 83690 ASSAY OF LIPASE: CPT

## 2022-05-12 PROCEDURE — 83880 ASSAY OF NATRIURETIC PEPTIDE: CPT

## 2022-05-12 PROCEDURE — 82077 ASSAY SPEC XCP UR&BREATH IA: CPT

## 2022-05-12 PROCEDURE — 99284 EMERGENCY DEPT VISIT MOD MDM: CPT

## 2022-05-12 PROCEDURE — 96361 HYDRATE IV INFUSION ADD-ON: CPT

## 2022-05-12 PROCEDURE — 96360 HYDRATION IV INFUSION INIT: CPT

## 2022-05-12 PROCEDURE — 81003 URINALYSIS AUTO W/O SCOPE: CPT

## 2022-05-12 PROCEDURE — 6370000000 HC RX 637 (ALT 250 FOR IP): Performed by: EMERGENCY MEDICINE

## 2022-05-12 RX ORDER — POTASSIUM CHLORIDE 20 MEQ/1
40 TABLET, EXTENDED RELEASE ORAL ONCE
Status: COMPLETED | OUTPATIENT
Start: 2022-05-12 | End: 2022-05-12

## 2022-05-12 RX ORDER — METOPROLOL TARTRATE 50 MG/1
25 TABLET, FILM COATED ORAL ONCE
Status: COMPLETED | OUTPATIENT
Start: 2022-05-12 | End: 2022-05-12

## 2022-05-12 RX ORDER — 0.9 % SODIUM CHLORIDE 0.9 %
1000 INTRAVENOUS SOLUTION INTRAVENOUS ONCE
Status: COMPLETED | OUTPATIENT
Start: 2022-05-12 | End: 2022-05-12

## 2022-05-12 RX ORDER — ADENOSINE 3 MG/ML
INJECTION, SOLUTION INTRAVENOUS
Status: DISCONTINUED
Start: 2022-05-12 | End: 2022-05-12 | Stop reason: WASHOUT

## 2022-05-12 RX ADMIN — METOPROLOL TARTRATE 25 MG: 50 TABLET, FILM COATED ORAL at 04:27

## 2022-05-12 RX ADMIN — SODIUM CHLORIDE 1000 ML: 9 INJECTION, SOLUTION INTRAVENOUS at 03:45

## 2022-05-12 RX ADMIN — POTASSIUM CHLORIDE 40 MEQ: 1500 TABLET, EXTENDED RELEASE ORAL at 04:27

## 2022-05-12 ASSESSMENT — ENCOUNTER SYMPTOMS
ABDOMINAL DISTENTION: 0
BLOOD IN STOOL: 0
SORE THROAT: 0
VOMITING: 0
SINUS PRESSURE: 0
RHINORRHEA: 0
SHORTNESS OF BREATH: 0
CHEST TIGHTNESS: 0
COUGH: 0
PHOTOPHOBIA: 0
DIARRHEA: 0
NAUSEA: 0
ABDOMINAL PAIN: 0
EYE DISCHARGE: 0
CHOKING: 0
EYE REDNESS: 0
TROUBLE SWALLOWING: 0
EYE ITCHING: 0
VOICE CHANGE: 0
WHEEZING: 0
EYE PAIN: 0
BACK PAIN: 0
CONSTIPATION: 0

## 2022-05-12 ASSESSMENT — PAIN DESCRIPTION - LOCATION: LOCATION: CHEST

## 2022-05-12 ASSESSMENT — PAIN - FUNCTIONAL ASSESSMENT: PAIN_FUNCTIONAL_ASSESSMENT: 0-10

## 2022-05-12 ASSESSMENT — PAIN SCALES - GENERAL: PAINLEVEL_OUTOF10: 6

## 2022-05-12 NOTE — ED PROVIDER NOTES
[facility]  eMERGENCY dEPARTMENT eNCOUnter          CHIEF COMPLAINT       Chief Complaint   Patient presents with    Chest Pain       Nurses Notes reviewed and I agree except as noted in the HPI. HISTORY OF PRESENT ILLNESS    Shiloh Douglas is a 35 y.o. female who presents for SVT. Apparently the patient has had SVT in the past.  She states that she had 1 earlier this week she did a vagal maneuver and it did go away. She states that he was at work tonight and she had another bout. She went to the bathroom to try to vagal to get out of it did not work so she called her fiancé to come get her and bring her to the hospital here today the patient is in what appears to be SVT. She is not complaining of anything other than some feeling of fluttering in her chest.  Patient denies any recent illness. She is not short of breath. She denies any abdominal pain or changes in bowel or bladder habits. Currently the patient is resting comfortably on the cot no apparent distress no other physical complaints at this time. Vagal maneuver was attempted at bedside without success. I then attempted a modified vagal maneuver which was successful in converting her back to sinus rhythm. REVIEW OF SYSTEMS     Review of Systems   Constitutional: Negative for activity change, appetite change, diaphoresis, fatigue and unexpected weight change. HENT: Negative for congestion, ear discharge, ear pain, hearing loss, rhinorrhea, sinus pressure, sore throat, trouble swallowing and voice change. Eyes: Negative for photophobia, pain, discharge, redness and itching. Respiratory: Negative for cough, choking, chest tightness, shortness of breath and wheezing. Cardiovascular: Positive for palpitations. Negative for chest pain and leg swelling. Gastrointestinal: Negative for abdominal distention, abdominal pain, blood in stool, constipation, diarrhea, nausea and vomiting.    Endocrine: Negative for polydipsia, polyphagia and polyuria. Genitourinary: Negative for decreased urine volume, difficulty urinating, dysuria, enuresis, frequency, hematuria and urgency. Musculoskeletal: Negative for arthralgias, back pain, gait problem, myalgias, neck pain and neck stiffness. Skin: Negative for pallor and rash. Allergic/Immunologic: Negative for immunocompromised state. Neurological: Negative for dizziness, tremors, seizures, syncope, facial asymmetry, weakness, light-headedness, numbness and headaches. Hematological: Negative for adenopathy. Does not bruise/bleed easily. Psychiatric/Behavioral: Negative for agitation, hallucinations and suicidal ideas. The patient is not nervous/anxious. PAST MEDICAL HISTORY    has a past medical history of Concussion, Conversion disorder with weakness or paralysis, acute episode, Expressive aphasia, and Pneumonia. SURGICAL HISTORY      has a past surgical history that includes Tubal ligation and danisha and bso (cervix removed) (N/A, 1/10/2022). CURRENT MEDICATIONS       Previous Medications    ACETAMINOPHEN (TYLENOL) 500 MG TABLET    Take by mouth every 6 hours as needed for Pain 2 tab    APPLE CIDER VINEGAR PO    Take by mouth daily     IBUPROFEN (ADVIL;MOTRIN) 800 MG TABLET    Take 1 tablet by mouth every 8 hours as needed for Pain    LORATADINE (CLARITIN PO)    Take by mouth daily as needed     MULTIPLE VITAMINS-MINERALS (MULTIVITAMIN ADULT EXTRA C PO)    Take by mouth daily     OLIVE LEAF PO    Take by mouth daily     VITAMIN D PO    Take by mouth daily        ALLERGIES     is allergic to latex, seasonal, and adhesive tape. FAMILY HISTORY     She indicated that her mother is alive. She indicated that her father is . She indicated that the status of her sister is unknown. She indicated that the status of her maternal grandmother is unknown. She indicated that her maternal grandfather is .  She indicated that her paternal grandmother is . She indicated that her paternal grandfather is . She indicated that the status of her other is unknown.   family history includes Cancer in her father, maternal grandfather, maternal grandmother, paternal grandfather, and paternal grandmother; Depression in her maternal grandfather; Early Death in an other family member; Heart Disease in an other family member; High Blood Pressure in her father, mother, and sister; High Cholesterol in her mother; Kidney Disease in her father; Other in her sister. SOCIAL HISTORY      reports that she has never smoked. She has never used smokeless tobacco. She reports current alcohol use. She reports that she does not use drugs. PHYSICAL EXAM     INITIAL VITALS:  height is 5' 4\" (1.626 m) and weight is 210 lb (95.3 kg). Her oral temperature is 97.8 °F (36.6 °C). Her blood pressure is 146/112 (abnormal) and her pulse is 102. Her respiration is 16 and oxygen saturation is 98%. Physical Exam  Vitals and nursing note reviewed. Constitutional:       General: She is not in acute distress. Appearance: She is well-developed. She is not diaphoretic. HENT:      Head: Normocephalic and atraumatic. Right Ear: External ear normal.      Left Ear: External ear normal.      Nose: Nose normal.      Mouth/Throat:      Mouth: Mucous membranes are moist.      Pharynx: Oropharynx is clear. No oropharyngeal exudate. Eyes:      General: No scleral icterus. Right eye: No discharge. Left eye: No discharge. Conjunctiva/sclera: Conjunctivae normal.      Pupils: Pupils are equal, round, and reactive to light. Neck:      Thyroid: No thyromegaly. Vascular: No JVD. Trachea: No tracheal deviation. Cardiovascular:      Rate and Rhythm: Regular rhythm. Tachycardia present. Pulses: Normal pulses. Heart sounds: Normal heart sounds, S1 normal and S2 normal. No murmur heard. No friction rub. No gallop.     Pulmonary:      Effort: Pulmonary effort is normal.      Breath sounds: Normal breath sounds. No stridor. No wheezing, rhonchi or rales. Chest:      Chest wall: No tenderness. Abdominal:      General: Bowel sounds are normal. There is no distension. Palpations: Abdomen is soft. There is no mass. Tenderness: There is no abdominal tenderness. There is no guarding or rebound. Hernia: No hernia is present. Musculoskeletal:         General: No tenderness. Normal range of motion. Cervical back: Normal range of motion and neck supple. Lymphadenopathy:      Cervical: No cervical adenopathy. Skin:     General: Skin is warm and dry. Capillary Refill: Capillary refill takes less than 2 seconds. Findings: No bruising, ecchymosis, lesion or rash. Neurological:      General: No focal deficit present. Mental Status: She is alert and oriented to person, place, and time. Mental status is at baseline. Cranial Nerves: No cranial nerve deficit. Sensory: No sensory deficit. Motor: No weakness. Coordination: Coordination normal.      Gait: Gait normal.      Deep Tendon Reflexes: Reflexes are normal and symmetric. Reflexes normal.   Psychiatric:         Speech: Speech normal.         Behavior: Behavior normal.         Thought Content: Thought content normal.         Judgment: Judgment normal.           DIFFERENTIAL DIAGNOSIS:   SVT A. fib a flutter V. tach    DIAGNOSTIC RESULTS     EKG: All EKG's are interpreted by the Emergency Department Physician who either signs or Co-signs this chart in the absence of a cardiologist.  Supraventricular tachycardia, normal axis, ventricular rate of 196 CT interval indeterminate QRS duration 82 QT interval 228 QTC of 411. Repeat EKG: Normal sinus rhythm, normal axis, ventricular rate of 95 CT interval 124 QRS duration 90 QT interval 336 QTC of 422.     RADIOLOGY: non-plain film images(s) such as CT, Ultrasound and MRI are read by the radiologist.  No orders to display         LABS:   Labs Reviewed   APTT - Abnormal; Notable for the following components:       Result Value    aPTT 41.7 (*)     All other components within normal limits   BASIC METABOLIC PANEL - Abnormal; Notable for the following components:    Potassium 3.4 (*)     Glucose 126 (*)     All other components within normal limits   COVID-19, RAPID   PROTIME-INR   CBC WITH AUTO DIFFERENTIAL   BRAIN NATRIURETIC PEPTIDE   HEPATIC FUNCTION PANEL   LIPASE   TROPONIN   MAGNESIUM   ETHANOL   URINALYSIS WITH REFLEX TO CULTURE   ANION GAP   OSMOLALITY   GLOMERULAR FILTRATION RATE, ESTIMATED   SCAN OF BLOOD SMEAR   URINE DRUG SCREEN       EMERGENCY DEPARTMENT COURSE:   Vitals:    Vitals:    05/12/22 0332 05/12/22 0335 05/12/22 0352 05/12/22 0430   BP: (!) 127/101   (!) 146/112   Pulse:  117 104 102   Resp:   18 16   Temp:       TempSrc:       SpO2:   99% 98%   Weight:       Height:         Patient was assessed at bedside labs and imaging were ordered. Patient was given fluids at bedside. I reviewed the labs patient had a mild decrease in her potassium she was given K-Dur. Here today the patient's blood pressure was slightly elevated given the fact that she goes into SVT I did give her 25 mg metoprolol p.o..  I reviewed all the labs and imaging all within normal limits. At this point I feel the patient be safely discharged home. She is instructed to follow-up with cardiology and do so within the next 1 to 2 days as well as her primary care physician. She is instructed to attempt vagal maneuvers should she have this problem again. She is instructed return to the nearest emergency room for any new or worsening complaints. This was discussed with the patient at bedside who understood and agreed to the plan. Patient is subsequently discharged home in stable condition. Patient has what appears to be supraventricular tachycardia. Patient is instructed to stay well-hydrated.   She is instructed to follow-up with her cardiologist and do so within the next 1 to 2 days. She is instructed to follow-up with her primary care physician and do so within the next 1 to 2 days. She is instructed to return to the nearest emergency room immediately for any new or worsening complaints. CRITICAL CARE:   None    CONSULTS:  None    PROCEDURES:  None    FINAL IMPRESSION      1.  Paroxysmal supraventricular tachycardia [facility])          DISPOSITION/PLAN   Discharge    PATIENT REFERRED TO:  [facility] [address] [place] [address]  [phone]  Call today      Rita Fry, APRN - CNP  [facility] [address]  [address] [place] [address] Road [facility]  [phone]    Call in 2 days        DISCHARGE MEDICATIONS:  New Prescriptions    No medications on file       (Please note that portions of this note were completed with a voice recognition program.  Efforts were made to edit the dictations but occasionally words are mis-transcribed.)    DO Marley Marquis DO  05/12/22 4334

## 2022-05-12 NOTE — ED NOTES
Pt resting in bed with no distress noted. Pt states she feels much better at this time. Respirations unlabored and easy. Call light within reach.       Winnie Pettit RN  05/12/22 0687

## 2022-05-12 NOTE — ED TRIAGE NOTES
Pt presents to ED with complaints of chest pain and arm pain since 1am tonight. Pt states she suffers from anxiety and she felt like that is what the problem is. Pt states that she felt like her heart was racing and she felt like she was breathing fast. Pt states she tried the vagal maneuver before coming in but it did not work. Soila Basilio

## 2022-05-12 NOTE — LETTER
[facility]  [place] [address] [place] [address]  Phone: [phone]  Fax: [phone]    Justin Flanagan MD        May 17, 2022    [address] [address]      Dear [facility]:    Our office has been unable to reach you at [phone]. We are calling to set up a new patient appointment, as result of your recent ER visit. To schedule an appointment, please call [phone] opt 3.     Sincerely,        Justin Flanagan MD

## 2022-05-23 NOTE — TELEPHONE ENCOUNTER
Patient stated she had a sick family member and was being isolated but is now out of that isolation period. She is requesting appt with dr. Ronan Riojas. No available appts this week.  Please contact patient for scheduling  [phone]

## 2022-06-01 ENCOUNTER — OFFICE VISIT (OUTPATIENT)
Dept: CARDIOLOGY CLINIC | Age: 42
End: 2022-06-01
Payer: COMMERCIAL

## 2022-06-01 VITALS
DIASTOLIC BLOOD PRESSURE: 70 MMHG | SYSTOLIC BLOOD PRESSURE: 130 MMHG | WEIGHT: 211 LBS | HEIGHT: 64 IN | BODY MASS INDEX: 36.02 KG/M2

## 2022-06-01 DIAGNOSIS — I47.1 SVT (SUPRAVENTRICULAR TACHYCARDIA) (HCC): Primary | ICD-10-CM

## 2022-06-01 PROCEDURE — 99204 OFFICE O/P NEW MOD 45 MIN: CPT | Performed by: NUCLEAR MEDICINE

## 2022-06-01 RX ORDER — ASCORBIC ACID 500 MG
500 TABLET ORAL DAILY
COMMUNITY

## 2022-06-01 RX ORDER — CETIRIZINE HYDROCHLORIDE 10 MG/1
10 TABLET ORAL DAILY
COMMUNITY

## 2022-06-01 ASSESSMENT — ENCOUNTER SYMPTOMS
COLOR CHANGE: 0
RECTAL PAIN: 0
NAUSEA: 0
BLOOD IN STOOL: 0
ANAL BLEEDING: 0
CHEST TIGHTNESS: 0
CONSTIPATION: 0
DIARRHEA: 0
ABDOMINAL DISTENTION: 0
SHORTNESS OF BREATH: 0
ABDOMINAL PAIN: 0
PHOTOPHOBIA: 0
BACK PAIN: 0
VOMITING: 0

## 2022-06-01 NOTE — PROGRESS NOTES
[address] [place]  [email or web address]  [address]  [facility] [address]  Dept: [phone]  Dept Fax: [phone]  Loc: [phone]    Visit Date: 6/1/2022    Brandt Lezama is a 35 y.o. female who presents todayfor:  Chief Complaint   Patient presents with    New Patient    Palpitations     Not seen in over 4 years   Used to have SVT   Did well for a while  Stopped following after that   Lately had some palpitation   Rapid heart beat   Was on some weight loss meds  Did have SVT on the ER   Broke by itself   No known CAD before  Some intermittent palpitation   Not frequent   Not too high on caffeine   No dizziness  No syncope  No smoking   Family history of CAD     HPI:  HPI  Past Medical History:   Diagnosis Date    Concussion     Conversion disorder with weakness or paralysis, acute episode     Expressive aphasia     Pneumonia       Past Surgical History:   Procedure Laterality Date    MARVIN AND BSO N/A 1/10/2022    TOTAL ABDOMINAL HYSTERECTOMY WITH BILATERAL SALPINGECTOMY POSS BILATERAL OOPHORECTOMY performed by Reji Sharma MD at [facility]       Family History   Problem Relation Age of Onset    High Blood Pressure Mother     High Cholesterol Mother     High Blood Pressure Father     Kidney Disease Father     Cancer Father     Cancer Maternal Grandmother     Cancer Maternal Grandfather     Depression Maternal Grandfather     Cancer Paternal Grandmother     Cancer Paternal Grandfather     Early Death Other     Heart Disease Other     High Blood Pressure Sister     Other Sister         seizures     Social History     Tobacco Use    Smoking status: Never Smoker    Smokeless tobacco: Never Used   Substance Use Topics    Alcohol use: Yes     Comment: occasionally      Current Outpatient Medications   Medication Sig Dispense Refill    cetirizine (ZYRTEC) 10 MG tablet Take 10 mg by mouth daily      vitamin C (ASCORBIC ACID) 500 MG tablet Take 500 mg by mouth daily      RANDY ROOT PO Take by mouth      NONFORMULARY menapause support      ibuprofen (ADVIL;MOTRIN) 800 MG tablet Take 1 tablet by mouth every 8 hours as needed for Pain 30 tablet 3    APPLE CIDER VINEGAR PO Take by mouth daily       VITAMIN D PO Take by mouth daily       acetaminophen (TYLENOL) 500 MG tablet Take by mouth every 6 hours as needed for Pain 2 tab      OLIVE LEAF PO Take by mouth daily       Multiple Vitamins-Minerals (MULTIVITAMIN ADULT EXTRA C PO) Take by mouth daily        No current facility-administered medications for this visit. Allergies   Allergen Reactions    Latex Rash    Seasonal     Adhesive Tape Rash     Health Maintenance   Topic Date Due    COVID-19 Vaccine (1) Never done    Hepatitis C screen  Never done    DTaP/Tdap/Td vaccine (1 - Tdap) Never done    Diabetes screen  Never done    Flu vaccine (Season Ended) 09/01/2022    Depression Screen  01/18/2023    Lipids  10/05/2025    HIV screen  Completed    Hepatitis A vaccine  Aged Out    Hepatitis B vaccine  Aged Out    Hib vaccine  Aged Out    Meningococcal (ACWY) vaccine  Aged Out    Pneumococcal 0-64 years Vaccine  Aged Out       Subjective:  Review of Systems   Constitutional: Positive for fatigue. HENT: Negative for ear pain and nosebleeds. Eyes: Negative for photophobia. Respiratory: Negative for chest tightness and shortness of breath. Cardiovascular: Positive for palpitations. Negative for chest pain. Gastrointestinal: Negative for abdominal distention, abdominal pain, anal bleeding, blood in stool, constipation, diarrhea, nausea, rectal pain and vomiting. Endocrine: Negative for polyphagia. Genitourinary: Negative for dysuria, hematuria and urgency. Musculoskeletal: Negative for arthralgias, back pain, gait problem, joint swelling, myalgias, neck pain and neck stiffness. Skin: Negative for color change, pallor, rash and wound. Allergic/Immunologic: Negative for food allergies. Neurological: Negative for dizziness, syncope and light-headedness. Psychiatric/Behavioral: Negative for confusion, decreased concentration, dysphoric mood, hallucinations and self-injury. The patient is not nervous/anxious and is not hyperactive. Objective:  Physical Exam  HENT:      Head: Normocephalic and atraumatic. Right Ear: Tympanic membrane normal.      Nose: Nose normal.      Mouth/Throat:      Mouth: Mucous membranes are dry. Eyes:      Pupils: Pupils are equal, round, and reactive to light. Cardiovascular:      Rate and Rhythm: Tachycardia present. Heart sounds: Murmur heard. No gallop. Pulmonary:      Effort: No respiratory distress. Breath sounds: No stridor. No wheezing, rhonchi or rales. Chest:      Chest wall: No tenderness. Abdominal:      General: There is no distension. Palpations: There is no mass. Tenderness: There is no abdominal tenderness. There is no right CVA tenderness, left CVA tenderness, guarding or rebound. Hernia: No hernia is present. Musculoskeletal:         General: No swelling, tenderness, deformity or signs of injury. Cervical back: Normal range of motion and neck supple. Right lower leg: No edema. Left lower leg: No edema. Skin:     Coloration: Skin is not jaundiced or pale. Findings: No bruising, erythema, lesion or rash. Neurological:      Mental Status: She is alert and oriented to person, place, and time. Cranial Nerves: No cranial nerve deficit. Sensory: No sensory deficit. Motor: No weakness. Coordination: Coordination normal.      Gait: Gait normal.      Deep Tendon Reflexes: Reflexes normal.   Psychiatric:         Mood and Affect: Mood normal.         Thought Content:  Thought content normal.       /70   Ht 5' 4\" (1.626 m)   Wt 211 lb (95.7 kg)   LMP 09/07/2021 (Approximate)   BMI 36.22 kg/m²     Assessment: Diagnosis Orders   1. SVT (supraventricular tachycardia) (HCC)     as above  Lower risk for CAD  Not frequent SVT episodes  Triggered by diet supplements  Other wise healthy       Plan:  No follow-ups on file. As above  Discussed options   Beta blockers vs close monitoring   She prefers to monitor for now   Continue risk factor modification and medical management  Thank you for allowing me to participate in the care of your patient. Please don't hesitate to contact me regarding any further issues related to the patient care    Orders Placed:  No orders of the defined types were placed in this encounter. Medications Prescribed:  No orders of the defined types were placed in this encounter. Discussed use, benefit, and side effects of prescribed medications. All patient questions answered. Pt voicedunderstanding. Instructed to continue current medications, diet and exercise. Continue risk factor modification and medical management. Patient agreed with treatment plan. Follow up as directed.     Electronically signedby Avery Boyd MD on 6/1/2022 at 12:25 PM

## 2022-11-10 ENCOUNTER — HOSPITAL ENCOUNTER (EMERGENCY)
Age: 42
Discharge: HOME | End: 2022-11-10
Payer: COMMERCIAL

## 2022-11-10 VITALS
OXYGEN SATURATION: 97 % | HEART RATE: 104 BPM | BODY MASS INDEX: 35 KG/M2 | TEMPERATURE: 97.3 F | WEIGHT: 205 LBS | DIASTOLIC BLOOD PRESSURE: 86 MMHG | HEIGHT: 64 IN | RESPIRATION RATE: 18 BRPM | SYSTOLIC BLOOD PRESSURE: 139 MMHG

## 2022-11-10 DIAGNOSIS — N76.0 VAGINITIS AND VULVOVAGINITIS: Primary | ICD-10-CM

## 2022-11-10 PROCEDURE — 99213 OFFICE O/P EST LOW 20 MIN: CPT

## 2022-11-10 PROCEDURE — 99213 OFFICE O/P EST LOW 20 MIN: CPT | Performed by: NURSE PRACTITIONER

## 2022-11-10 RX ORDER — FLUCONAZOLE 150 MG/1
TABLET ORAL
Qty: 2 TABLET | Refills: 0 | Status: SHIPPED | OUTPATIENT
Start: 2022-11-10

## 2022-11-10 RX ORDER — METRONIDAZOLE 500 MG/1
500 TABLET ORAL 2 TIMES DAILY
Qty: 14 TABLET | Refills: 0 | Status: SHIPPED | OUTPATIENT
Start: 2022-11-10 | End: 2022-11-17

## 2022-11-10 ASSESSMENT — PAIN - FUNCTIONAL ASSESSMENT: PAIN_FUNCTIONAL_ASSESSMENT: NONE - DENIES PAIN

## 2022-11-10 ASSESSMENT — LIFESTYLE VARIABLES: HOW OFTEN DO YOU HAVE A DRINK CONTAINING ALCOHOL: NEVER

## 2022-11-10 ASSESSMENT — ENCOUNTER SYMPTOMS
VOMITING: 0
BACK PAIN: 0
TROUBLE SWALLOWING: 0
SHORTNESS OF BREATH: 0
NAUSEA: 0
ABDOMINAL PAIN: 0

## 2022-11-10 NOTE — ED PROVIDER NOTES
[facility]  Urgent Care Encounter      CHIEF COMPLAINT       Chief Complaint   Patient presents with    Vaginitis       Nurses Notes reviewed and I agree except as noted in the HPI. HISTORY OF PRESENT ILLNESS   Khalida Hendrix is a 38 y.o. female who presents for evaluation of vaginal itching/irritation. Onset of symptoms less than 1 week ago, unchanged. Patient believes that she has either a yeast infection or a bacterial vaginosis. Denies STD exposure. Denies dysuria. No fever, hematuria, abdominal pain, back pain. Patient states that symptoms began after using a sex toy. No treatment prior to arrival.    REVIEW OF SYSTEMS     Review of Systems   Constitutional:  Negative for fever. HENT:  Negative for trouble swallowing. Respiratory:  Negative for shortness of breath. Cardiovascular:  Negative for chest pain. Gastrointestinal:  Negative for abdominal pain, nausea and vomiting. Genitourinary:  Negative for decreased urine volume, difficulty urinating, dysuria, flank pain, frequency, genital sores, hematuria, menstrual problem, pelvic pain, urgency, vaginal bleeding, vaginal discharge and vaginal pain. Musculoskeletal:  Negative for back pain, neck pain and neck stiffness. Skin:  Negative for rash. Neurological:  Negative for headaches. Hematological:  Negative for adenopathy. Psychiatric/Behavioral:  Negative for sleep disturbance. PAST MEDICAL HISTORY         Diagnosis Date    Concussion     Conversion disorder with weakness or paralysis, acute episode     Expressive aphasia     Pneumonia        SURGICAL HISTORY     Patient  has a past surgical history that includes Tubal ligation and Total abdominal hysterectomy w/ bilateral salpingoophorectomy (N/A, 1/10/2022).     CURRENT MEDICATIONS       Discharge Medication List as of 11/10/2022  2:55 PM        CONTINUE these medications which have NOT CHANGED    Details   cetirizine (ZYRTEC) 10 MG tablet Take 10 mg by mouth dailyHistorical Med      vitamin C (ASCORBIC ACID) 500 MG tablet Take 500 mg by mouth dailyHistorical Med      RANDY ROOT PO Take by mouthHistorical Med      NONFORMULARY menapause supportHistorical Med      ibuprofen (ADVIL;MOTRIN) 800 MG tablet Take 1 tablet by mouth every 8 hours as needed for Pain, Disp-30 tablet, R-3Normal      APPLE CIDER VINEGAR PO Take by mouth daily Historical Med      VITAMIN D PO Take by mouth daily Historical Med      acetaminophen (TYLENOL) 500 MG tablet Take by mouth every 6 hours as needed for Pain 2 tabHistorical Med      OLIVE LEAF PO Take by mouth daily Historical Med      Multiple Vitamins-Minerals (MULTIVITAMIN ADULT EXTRA C PO) Take by mouth daily Historical Med             ALLERGIES     Patient is is allergic to latex, seasonal, and adhesive tape. FAMILY HISTORY     Patient'sfamily history includes Cancer in her father, maternal grandfather, maternal grandmother, paternal grandfather, and paternal grandmother; Depression in her maternal grandfather; Early Death in an other family member; Heart Disease in an other family member; High Blood Pressure in her father, mother, and sister; High Cholesterol in her mother; Kidney Disease in her father; Other in her sister. SOCIAL HISTORY     Patient  reports that she has never smoked. She has never used smokeless tobacco. She reports current alcohol use. She reports that she does not use drugs. PHYSICAL EXAM     ED TRIAGE VITALS  BP: 139/86, Temp: 97.3 °F (36.3 °C), Heart Rate: (!) 104, Resp: 18, SpO2: 97 %  Physical Exam  Vitals and nursing note reviewed. Constitutional:       General: She is not in acute distress. Appearance: Normal appearance. She is well-developed. She is not ill-appearing, toxic-appearing or diaphoretic. HENT:      Head: Normocephalic and atraumatic. Eyes:      General: No scleral icterus.      Conjunctiva/sclera: Conjunctivae normal.   Pulmonary:      Effort: Pulmonary effort is normal. No respiratory distress. Abdominal:      General: There is no distension. Musculoskeletal:      Lumbar back: Normal.   Skin:     General: Skin is warm and dry. Capillary Refill: Capillary refill takes less than 2 seconds. Coloration: Skin is not jaundiced. Findings: No rash. Neurological:      Mental Status: She is alert and oriented to person, place, and time. Psychiatric:         Mood and Affect: Mood normal.         Behavior: Behavior normal. Behavior is cooperative. DIAGNOSTIC RESULTS   Labs:No results found for this visit on 11/10/22. IMAGING:  No orders to display      URGENT CARE COURSE:     Vitals:    11/10/22 1429   BP: 139/86   Pulse: (!) 104   Resp: 18   Temp: 97.3 °F (36.3 °C)   TempSrc: Temporal   SpO2: 97%   Weight: 205 lb (93 kg)   Height: 5' 4\" (1.626 m)       Medications - No data to display  PROCEDURES:  None  FINAL IMPRESSION      1. Vaginitis and vulvovaginitis        DISPOSITION/PLAN   DISPOSITION Decision To Discharge 11/10/2022 02:54:27 PM    Treated for yeast/BV based on clinical presentation. Follow-up as needed for additional testing. PATIENT REFERRED TO:  RONIT Wolff - [place] [id number] [address]  [address] [facility]      Follow-up as needed. Medication as prescribed. If symptoms worsen return or go to ER. DISCHARGE MEDICATIONS:  Discharge Medication List as of 11/10/2022  2:55 PM        START taking these medications    Details   metroNIDAZOLE (FLAGYL) 500 MG tablet Take 1 tablet by mouth in the morning and 1 tablet in the evening. Do all this for 7 days. , Disp-14 tablet, R-0Normal      fluconazole (DIFLUCAN) 150 MG tablet Take 1 tablet by mouth once, may repeat if still having symptoms in 3 days. , Disp-2 tablet, R-0Normal           Discharge Medication List as of 11/10/2022  2:55 PM          [address] [facility] APRN - [facility]  11/10/22 7876

## 2023-02-08 ENCOUNTER — APPOINTMENT (OUTPATIENT)
Dept: CT IMAGING | Age: 43
End: 2023-02-08
Payer: COMMERCIAL

## 2023-02-08 ENCOUNTER — APPOINTMENT (OUTPATIENT)
Dept: GENERAL RADIOLOGY | Age: 43
End: 2023-02-08
Payer: COMMERCIAL

## 2023-02-08 ENCOUNTER — HOSPITAL ENCOUNTER (EMERGENCY)
Age: 43
Discharge: HOME | End: 2023-02-08
Attending: EMERGENCY MEDICINE
Payer: COMMERCIAL

## 2023-02-08 VITALS
BODY MASS INDEX: 35.51 KG/M2 | TEMPERATURE: 98.9 F | RESPIRATION RATE: 18 BRPM | OXYGEN SATURATION: 100 % | DIASTOLIC BLOOD PRESSURE: 82 MMHG | WEIGHT: 208 LBS | HEART RATE: 85 BPM | SYSTOLIC BLOOD PRESSURE: 129 MMHG | HEIGHT: 64 IN

## 2023-02-08 DIAGNOSIS — R07.89 ATYPICAL CHEST PAIN: Primary | ICD-10-CM

## 2023-02-08 LAB
ANION GAP SERPL CALC-SCNC: 13 MEQ/L (ref 8–16)
BASOPHILS ABSOLUTE: 0 THOU/MM3 (ref 0–0.1)
BASOPHILS NFR BLD AUTO: 0.2 %
BUN SERPL-MCNC: 22 MG/DL (ref 7–22)
CALCIUM SERPL-MCNC: 9.4 MG/DL (ref 8.5–10.5)
CHLORIDE SERPL-SCNC: 102 MEQ/L (ref 98–111)
CO2 SERPL-SCNC: 21 MEQ/L (ref 23–33)
CREAT SERPL-MCNC: 0.6 MG/DL (ref 0.4–1.2)
D DIMER PPP IA.FEU-MCNC: 775 NG/ML FEU (ref 0–500)
DEPRECATED RDW RBC AUTO: 44.1 FL (ref 35–45)
EKG ATRIAL RATE: 84 BPM
EKG P AXIS: 52 DEGREES
EKG P-R INTERVAL: 154 MS
EKG Q-T INTERVAL: 358 MS
EKG QRS DURATION: 98 MS
EKG QTC CALCULATION (BAZETT): 423 MS
EKG R AXIS: 13 DEGREES
EKG T AXIS: 30 DEGREES
EKG VENTRICULAR RATE: 84 BPM
EOSINOPHIL NFR BLD AUTO: 2.5 %
EOSINOPHILS ABSOLUTE: 0.2 THOU/MM3 (ref 0–0.4)
ERYTHROCYTE [DISTWIDTH] IN BLOOD BY AUTOMATED COUNT: 12.7 % (ref 11.5–14.5)
GFR SERPL CREATININE-BSD FRML MDRD: > 60 ML/MIN/1.73M2
GLUCOSE SERPL-MCNC: 97 MG/DL (ref 70–108)
HCT VFR BLD AUTO: 41.1 % (ref 37–47)
HGB BLD-MCNC: 13.6 GM/DL (ref 12–16)
IMM GRANULOCYTES # BLD AUTO: 0.01 THOU/MM3 (ref 0–0.07)
IMM GRANULOCYTES NFR BLD AUTO: 0.1 %
LYMPHOCYTES ABSOLUTE: 3.1 THOU/MM3 (ref 1–4.8)
LYMPHOCYTES NFR BLD AUTO: 34.5 %
MCH RBC QN AUTO: 31.1 PG (ref 26–33)
MCHC RBC AUTO-ENTMCNC: 33.1 GM/DL (ref 32.2–35.5)
MCV RBC AUTO: 93.8 FL (ref 81–99)
MONOCYTES ABSOLUTE: 0.6 THOU/MM3 (ref 0.4–1.3)
MONOCYTES NFR BLD AUTO: 7 %
NEUTROPHILS NFR BLD AUTO: 55.7 %
NRBC BLD AUTO-RTO: 0 /100 WBC
OSMOLALITY SERPL CALC.SUM OF ELEC: 275.2 MOSMOL/KG (ref 275–300)
PLATELET # BLD AUTO: 280 THOU/MM3 (ref 130–400)
PMV BLD AUTO: 9.8 FL (ref 9.4–12.4)
POTASSIUM SERPL-SCNC: 3.9 MEQ/L (ref 3.5–5.2)
RBC # BLD AUTO: 4.38 MILL/MM3 (ref 4.2–5.4)
SEGMENTED NEUTROPHILS ABSOLUTE COUNT: 5.1 THOU/MM3 (ref 1.8–7.7)
SODIUM SERPL-SCNC: 136 MEQ/L (ref 135–145)
TROPONIN T: < 0.01 NG/ML
TROPONIN T: < 0.01 NG/ML
WBC # BLD AUTO: 9.1 THOU/MM3 (ref 4.8–10.8)

## 2023-02-08 PROCEDURE — 71275 CT ANGIOGRAPHY CHEST: CPT

## 2023-02-08 PROCEDURE — 99285 EMERGENCY DEPT VISIT HI MDM: CPT

## 2023-02-08 PROCEDURE — 6360000002 HC RX W HCPCS: Performed by: EMERGENCY MEDICINE

## 2023-02-08 PROCEDURE — 80048 BASIC METABOLIC PNL TOTAL CA: CPT

## 2023-02-08 PROCEDURE — 36415 COLL VENOUS BLD VENIPUNCTURE: CPT

## 2023-02-08 PROCEDURE — 6360000004 HC RX CONTRAST MEDICATION: Performed by: EMERGENCY MEDICINE

## 2023-02-08 PROCEDURE — 84484 ASSAY OF TROPONIN QUANT: CPT

## 2023-02-08 PROCEDURE — 71045 X-RAY EXAM CHEST 1 VIEW: CPT

## 2023-02-08 PROCEDURE — 96374 THER/PROPH/DIAG INJ IV PUSH: CPT

## 2023-02-08 PROCEDURE — 93005 ELECTROCARDIOGRAM TRACING: CPT | Performed by: EMERGENCY MEDICINE

## 2023-02-08 PROCEDURE — 96375 TX/PRO/DX INJ NEW DRUG ADDON: CPT

## 2023-02-08 PROCEDURE — 85379 FIBRIN DEGRADATION QUANT: CPT

## 2023-02-08 PROCEDURE — 85025 COMPLETE CBC W/AUTO DIFF WBC: CPT

## 2023-02-08 RX ORDER — ONDANSETRON 2 MG/ML
4 INJECTION INTRAMUSCULAR; INTRAVENOUS ONCE
Status: COMPLETED | OUTPATIENT
Start: 2023-02-08 | End: 2023-02-08

## 2023-02-08 RX ORDER — MORPHINE SULFATE 4 MG/ML
4 INJECTION, SOLUTION INTRAMUSCULAR; INTRAVENOUS ONCE
Status: COMPLETED | OUTPATIENT
Start: 2023-02-08 | End: 2023-02-08

## 2023-02-08 RX ADMIN — ONDANSETRON 4 MG: 2 INJECTION INTRAMUSCULAR; INTRAVENOUS at 08:19

## 2023-02-08 RX ADMIN — IOPAMIDOL 80 ML: 755 INJECTION, SOLUTION INTRAVENOUS at 09:58

## 2023-02-08 RX ADMIN — MORPHINE SULFATE 4 MG: 4 INJECTION, SOLUTION INTRAMUSCULAR; INTRAVENOUS at 08:19

## 2023-02-08 ASSESSMENT — PAIN SCALES - GENERAL
PAINLEVEL_OUTOF10: 2
PAINLEVEL_OUTOF10: 7

## 2023-02-08 ASSESSMENT — PAIN - FUNCTIONAL ASSESSMENT: PAIN_FUNCTIONAL_ASSESSMENT: 0-10

## 2023-02-08 ASSESSMENT — PAIN DESCRIPTION - DESCRIPTORS: DESCRIPTORS: PRESSURE

## 2023-02-08 ASSESSMENT — PAIN DESCRIPTION - LOCATION: LOCATION: CHEST

## 2023-02-08 NOTE — ED NOTES
Report given to [facility].      Sg Gamble RN  02/08/23 5535

## 2023-02-08 NOTE — ED TRIAGE NOTES
Pt arrives to ED from home for c/o chest pain that began yesterday. Pt states she has been working all night and pain has gotten worse.

## 2023-02-08 NOTE — ED PROVIDER NOTES
[address] [facility] [address] [address] EMERGENCY DEPT  [facility] [address]  Phone: [phone]      CHIEF COMPLAINT       Chief Complaint   Patient presents with    Chest Pain       Nurses Notes reviewed and I agree except as noted in the HPI. HISTORY OF PRESENT ILLNESS    Lizandro Tatum is a 38 y.o. female. This patient works at the [email or web address] She does do some heavy lifting and a lot of repetitive motion involving the left upper extremity and she has developed pain in the left upper chest that goes to the left shoulder. It is worse with movement and is also worse with deep breaths. She initially denied any cardiac history in the sense that she has never had a heart catheterization or stress test but reportedly has seen Dr. Vin Knapp in the past for SVT. The patient has not had any shortness of breath no nausea vomiting or sweating. No recent travel. No lower extremity pain or swelling. Prior hysterectomy in the distant past due to fibroids. HEART score = 1    REVIEW OF SYSTEMS         No fever, no coughing    Remainder of review of systems is otherwise reviewed as negative. PAST MEDICAL HISTORY    has a past medical history of Concussion, Conversion disorder with weakness or paralysis, acute episode, Expressive aphasia, and Pneumonia. SURGICAL HISTORY      has a past surgical history that includes Tubal ligation and Total abdominal hysterectomy w/ bilateral salpingoophorectomy (N/A, 1/10/2022). CURRENT MEDICATIONS       Previous Medications    ACETAMINOPHEN (TYLENOL) 500 MG TABLET    Take by mouth every 6 hours as needed for Pain 2 tab    APPLE CIDER VINEGAR PO    Take by mouth daily     CETIRIZINE (ZYRTEC) 10 MG TABLET    Take 10 mg by mouth daily    FLUCONAZOLE (DIFLUCAN) 150 MG TABLET    Take 1 tablet by mouth once, may repeat if still having symptoms in 3 days.     IBUPROFEN (ADVIL;MOTRIN) 800 MG TABLET    Take 1 tablet by mouth every 8 hours as needed for Pain    RANDY ROOT PO Take by mouth    MULTIPLE VITAMINS-MINERALS (MULTIVITAMIN ADULT EXTRA C PO)    Take by mouth daily     NONFORMULARY    menapause support    OLIVE LEAF PO    Take by mouth daily     VITAMIN C (ASCORBIC ACID) 500 MG TABLET    Take 500 mg by mouth daily    VITAMIN D PO    Take by mouth daily        ALLERGIES     is allergic to latex, seasonal, and adhesive tape. FAMILY HISTORY     She indicated that her mother is alive. She indicated that her father is . She indicated that the status of her sister is unknown. She indicated that the status of her maternal grandmother is unknown. She indicated that her maternal grandfather is . She indicated that her paternal grandmother is . She indicated that her paternal grandfather is . She indicated that the status of her other is unknown.   family history includes Cancer in her father, maternal grandfather, maternal grandmother, paternal grandfather, and paternal grandmother; Depression in her maternal grandfather; Early Death in an other family member; Heart Disease in an other family member; High Blood Pressure in her father, mother, and sister; High Cholesterol in her mother; Kidney Disease in her father; Other in her sister. SOCIAL HISTORY      reports that she has never smoked. She has never been exposed to tobacco smoke. She has never used smokeless tobacco. She reports current alcohol use. She reports that she does not use drugs. PHYSICAL EXAM     INITIAL VITALS:  height is 5' 4\" (1.626 m) and weight is 208 lb (94.3 kg). Her oral temperature is 98.9 °F (37.2 °C). Her blood pressure is 163/105 (abnormal) and her pulse is 79. Her respiration is 18 and oxygen saturation is 98%. Constitutional: Well appearing and non-toxic   Eyes:  Pupils are equal and reactive, extraocular muscles intact   HENT:  Atraumatic appearing  oropharynx moist, no pharyngeal exudates.   Neck- normal range of motion, supple   Respiratory:  No wheezing, rhonchi or rales  Cardiovascular: regular  GI:  Non tender, no rigidity, rebound or guarding  Musculoskeletal:  2/4 distal pulses, no pitting edema  Integument: warm and dry   Neurologic:  Alert & oriented x 3  Psychiatric:  Speech and behavior appropriate        DIAGNOSTIC RESULTS     EKG: All EKG's are interpreted by the Emergency Department Physician who either signs or Co-signs this chart in the absence of a cardiologist.  EKG interpreted by me showing sinus rhythm at a rate of 84, QRS of 98, QTc of 4-23, axis of 13. Right bundle pattern    RADIOLOGY: non-plain film images(s) such as CT, Ultrasound and MRI are read by the radiologist.  CT of the chest interpreted by the radiologist negative for pulmonary embolism with slight thickening of interstitial lung markings. Chest x-ray initially interpreted by the radiologist as negative. LABS:   Labs Reviewed   BASIC METABOLIC PANEL W/ REFLEX TO MG FOR LOW K - Abnormal; Notable for the following components:       Result Value    CO2 21 (*)     All other components within normal limits   CBC WITH AUTO DIFFERENTIAL   ANION GAP   GLOMERULAR FILTRATION RATE, ESTIMATED   OSMOLALITY   TROPONIN   D-DIMER, QUANTITATIVE       EMERGENCY DEPARTMENT COURSE:   Vitals:    Vitals:    02/08/23 0652 02/08/23 0656   BP:  (!) 163/105   Pulse:  79   Resp:  18   Temp:  98.9 °F (37.2 °C)   TempSrc:  Oral   SpO2:  98%   Weight: 208 lb (94.3 kg)    Height: 5' 4\" (1.626 m)      The patient does report doing some repetitive motion involving the left shoulder and arm and this could be a musculoskeletal issue however we want to rule out from a cardiac standpoint as well and her first troponin has come back negative and her 3-hour troponin has come back negative as well they are both undetectable. She has no significant abnormalities on the EKG at this point with no evidence of ischemia. We ended up doing a CTA of the chest to rule out pulmonary embolism because her D-dimer came back elevated. There is no evidence of pulmonary embolism, dissection, or symptoms are not suggestive of pneumonia at this time. We are advising the patient to follow-up with her primary physician to get an outpatient stress test scheduled. She is noted to have a very low heart score, due to minimal risk factors.         CRITICAL CARE:   none         FINAL IMPRESSION    Atypical chest pain    DISPOSITION/PLAN   discharged    DISCHARGE MEDICATIONS:  New Prescriptions    No medications on file       (Please note that portions of this note were completed with a voice recognition program.  Efforts were made to edit the dictations but occasionally words are mis-transcribed.)    [address],   02/08/23 7939

## 2023-02-08 NOTE — DISCHARGE INSTRUCTIONS
You will need to follow-up with your primary physician for further testing including likely a outpatient stress test.  Return to ER for any change in severity, nature, location of pain, persistent vomiting, or any new symptoms or concerns.

## 2023-02-09 ENCOUNTER — PATIENT MESSAGE (OUTPATIENT)
Dept: FAMILY MEDICINE CLINIC | Age: 43
End: 2023-02-09

## 2023-02-16 ENCOUNTER — OFFICE VISIT (OUTPATIENT)
Dept: CARDIOLOGY CLINIC | Age: 43
End: 2023-02-16
Payer: COMMERCIAL

## 2023-02-16 VITALS
SYSTOLIC BLOOD PRESSURE: 170 MMHG | WEIGHT: 210 LBS | DIASTOLIC BLOOD PRESSURE: 98 MMHG | BODY MASS INDEX: 35.85 KG/M2 | HEART RATE: 82 BPM | HEIGHT: 64 IN

## 2023-02-16 DIAGNOSIS — R07.2 PRECORDIAL PAIN: Primary | ICD-10-CM

## 2023-02-16 PROCEDURE — 99214 OFFICE O/P EST MOD 30 MIN: CPT | Performed by: NUCLEAR MEDICINE

## 2023-02-16 RX ORDER — METOPROLOL SUCCINATE 50 MG/1
50 TABLET, EXTENDED RELEASE ORAL DAILY
Qty: 30 TABLET | Refills: 5 | Status: SHIPPED | OUTPATIENT
Start: 2023-02-16

## 2023-02-16 RX ORDER — METOPROLOL SUCCINATE 50 MG/1
50 TABLET, EXTENDED RELEASE ORAL DAILY
COMMUNITY
End: 2023-02-16 | Stop reason: SDUPTHER

## 2023-02-16 NOTE — PROGRESS NOTES
[address] [facility] [employer] .  [address] [place] [address]  Dept: [phone]  Dept Fax: [phone]  Loc: [phone]    Visit Date: 2/16/2023    Richie Lennox is a 38 y.o. female who presents todayfor:  Chief Complaint   Patient presents with    Follow-up    Chest Pain   Was in the ER for chest pain   Has been under stress   had CHEST PRESSURE    Left sided   Over few days  Was steady   Movement made it worse   Not exertional   No known CAD   Known SVT   No recent episodes  Felt better since then       HPI:  HPI  Past Medical History:   Diagnosis Date    Concussion     Conversion disorder with weakness or paralysis, acute episode     Expressive aphasia     Pneumonia       Past Surgical History:   Procedure Laterality Date    MARVIN AND BSO (CERVIX REMOVED) N/A 1/10/2022    TOTAL ABDOMINAL HYSTERECTOMY WITH BILATERAL SALPINGECTOMY POSS BILATERAL OOPHORECTOMY performed by Court Choe MD at [address]       Family History   Problem Relation Age of Onset    High Blood Pressure Mother     High Cholesterol Mother     High Blood Pressure Father     Kidney Disease Father     Cancer Father     Cancer Maternal Grandmother     Cancer Maternal Grandfather     Depression Maternal Grandfather     Cancer Paternal Grandmother     Cancer Paternal Grandfather     Early Death Other     Heart Disease Other     High Blood Pressure Sister     Other Sister         seizures     Social History     Tobacco Use    Smoking status: Never     Passive exposure: Never    Smokeless tobacco: Never   Substance Use Topics    Alcohol use: Yes     Comment: occasionally      Current Outpatient Medications   Medication Sig Dispense Refill    fluconazole (DIFLUCAN) 150 MG tablet Take 1 tablet by mouth once, may repeat if still having symptoms in 3 days.  2 tablet 0    cetirizine (ZYRTEC) 10 MG tablet Take 10 mg by mouth daily      vitamin C (ASCORBIC ACID) 500 MG tablet Take 500 mg by mouth daily      RANDY ROOT PO Take by mouth      NONFORMULARY menapause support      ibuprofen (ADVIL;MOTRIN) 800 MG tablet Take 1 tablet by mouth every 8 hours as needed for Pain 30 tablet 3    APPLE CIDER VINEGAR PO Take by mouth daily       VITAMIN D PO Take by mouth daily       acetaminophen (TYLENOL) 500 MG tablet Take by mouth every 6 hours as needed for Pain 2 tab      OLIVE LEAF PO Take by mouth daily       Multiple Vitamins-Minerals (MULTIVITAMIN ADULT EXTRA C PO) Take by mouth daily        No current facility-administered medications for this visit. Allergies   Allergen Reactions    Latex Rash    Seasonal     Adhesive Tape Rash     Health Maintenance   Topic Date Due    COVID-19 Vaccine (1) Never done    Hepatitis C screen  Never done    DTaP/Tdap/Td vaccine (1 - Tdap) Never done    Flu vaccine (1) 08/01/2022    Depression Screen  01/18/2023    Lipids  10/05/2025    HIV screen  Completed    Hepatitis A vaccine  Aged Out    Hib vaccine  Aged Out    Meningococcal (ACWY) vaccine  Aged Out    Pneumococcal 0-64 years Vaccine  Aged Out       Subjective:  General:   No fever, no chills, No fatigue or weight loss  Pulmonary:    some dyspnea, no wheezing  Cardiac:    Did have recent chest pain,   GI:     No nausea or vomiting, no abdominal pain  Neuro:    No dizziness or light headedness,   Musculoskeletal:  No recent active issues  Extremities:   No edema, no obvious claudication       Objective:  General:   Well developed, well nourished  Lungs:   Clear to auscultation  Heart:    Normal S1 S2, Slight murmur. no rubs, no gallops  Abdomen:   Soft, non tender, no organomegalies, positive bowel sounds  Extremities:   No edema, no cyanosis, good peripheral pulses  Neurological:   Awake, alert, oriented.  No obvious focal deficits  Musculoskelatal:  No obvious deformities   BP (!) 170/98   Pulse 82   Ht 5' 4\" (1.626 m)   Wt 210 lb (95.3 kg)   LMP 09/07/2021 (Approximate)   BMI 36.05 kg/m² Assessment:      Diagnosis Orders   1. Precordial pain        Atypical chest pain   ?/ muscular vs cardiac   Higher BP lately       Plan:  No follow-ups on file. Discussed options   Start toprol   Consider a stress test if clinically indicated   Continue risk factor modification and medical management  Thank you for allowing me to participate in the care of your patient. Please don't hesitate to contact me regarding any further issues related to the patient care      Orders Placed:  No orders of the defined types were placed in this encounter. Prescribed:  No orders of the defined types were placed in this encounter. Discussed use, benefit, and side effects of prescribed medications. All patient questions answered. Pt voicedunderstanding. Instructed to continue current medications, diet and exercise. Continue risk factor modification and medical management. Patient agreed with treatment plan. Follow up as directed.     Electronically signedby Brina Arellano MD on 2/16/2023 at 1:34 PM

## 2023-06-29 ENCOUNTER — HOSPITAL ENCOUNTER (EMERGENCY)
Age: 43
Discharge: HOME | End: 2023-06-29
Payer: COMMERCIAL

## 2023-06-29 VITALS
BODY MASS INDEX: 34.67 KG/M2 | WEIGHT: 202 LBS | SYSTOLIC BLOOD PRESSURE: 149 MMHG | HEART RATE: 86 BPM | OXYGEN SATURATION: 97 % | TEMPERATURE: 98.9 F | RESPIRATION RATE: 16 BRPM | DIASTOLIC BLOOD PRESSURE: 104 MMHG

## 2023-06-29 DIAGNOSIS — J02.9 VIRAL PHARYNGITIS: Primary | ICD-10-CM

## 2023-06-29 LAB — S PYO AG THROAT QL: NEGATIVE

## 2023-06-29 PROCEDURE — 87651 STREP A DNA AMP PROBE: CPT

## 2023-06-29 PROCEDURE — 87070 CULTURE OTHR SPECIMN AEROBIC: CPT

## 2023-06-29 PROCEDURE — 87205 SMEAR GRAM STAIN: CPT

## 2023-06-29 PROCEDURE — 99213 OFFICE O/P EST LOW 20 MIN: CPT

## 2023-06-29 PROCEDURE — 99213 OFFICE O/P EST LOW 20 MIN: CPT | Performed by: NURSE PRACTITIONER

## 2023-06-29 RX ORDER — FLUCONAZOLE 150 MG/1
150 TABLET ORAL
Qty: 2 TABLET | Refills: 0 | Status: SHIPPED | OUTPATIENT
Start: 2023-06-29 | End: 2023-07-05

## 2023-06-29 ASSESSMENT — PAIN - FUNCTIONAL ASSESSMENT: PAIN_FUNCTIONAL_ASSESSMENT: 0-10

## 2023-06-29 ASSESSMENT — PAIN SCALES - GENERAL: PAINLEVEL_OUTOF10: 6

## 2023-06-29 ASSESSMENT — ENCOUNTER SYMPTOMS: SORE THROAT: 1

## 2023-06-29 ASSESSMENT — PAIN DESCRIPTION - LOCATION: LOCATION: THROAT

## 2023-06-30 ENCOUNTER — TELEPHONE (OUTPATIENT)
Dept: FAMILY MEDICINE CLINIC | Age: 43
End: 2023-06-30

## 2023-07-02 LAB
BACTERIA GENITAL AEROBE CULT: NORMAL
GRAM STN GENITAL: NORMAL

## 2023-08-17 ENCOUNTER — NURSE ONLY (OUTPATIENT)
Dept: LAB | Age: 43
End: 2023-08-17

## 2023-08-17 ENCOUNTER — OFFICE VISIT (OUTPATIENT)
Dept: FAMILY MEDICINE CLINIC | Age: 43
End: 2023-08-17
Payer: COMMERCIAL

## 2023-08-17 VITALS
RESPIRATION RATE: 16 BRPM | HEART RATE: 76 BPM | SYSTOLIC BLOOD PRESSURE: 134 MMHG | WEIGHT: 211 LBS | BODY MASS INDEX: 36.22 KG/M2 | DIASTOLIC BLOOD PRESSURE: 80 MMHG

## 2023-08-17 DIAGNOSIS — M25.50 GENERALIZED JOINT PAIN: ICD-10-CM

## 2023-08-17 DIAGNOSIS — I47.1 SVT (SUPRAVENTRICULAR TACHYCARDIA) (HCC): ICD-10-CM

## 2023-08-17 DIAGNOSIS — M25.50 GENERALIZED JOINT PAIN: Primary | ICD-10-CM

## 2023-08-17 DIAGNOSIS — M25.60 JOINT STIFFNESS: ICD-10-CM

## 2023-08-17 LAB
CRP SERPL-MCNC: 0.59 MG/DL (ref 0–1)
ERYTHROCYTE [SEDIMENTATION RATE] IN BLOOD BY WESTERGREN METHOD: 26 MM/HR (ref 0–20)
RHEUMATOID FACT SERPL-ACNC: < 10 IU/ML (ref 0–13)
URATE SERPL-MCNC: 4.9 MG/DL (ref 2.4–5.7)

## 2023-08-17 PROCEDURE — 99213 OFFICE O/P EST LOW 20 MIN: CPT | Performed by: NURSE PRACTITIONER

## 2023-08-17 RX ORDER — PREDNISONE 20 MG/1
TABLET ORAL
Qty: 25 TABLET | Refills: 0 | Status: SHIPPED | OUTPATIENT
Start: 2023-08-17

## 2023-08-17 SDOH — ECONOMIC STABILITY: FOOD INSECURITY: WITHIN THE PAST 12 MONTHS, YOU WORRIED THAT YOUR FOOD WOULD RUN OUT BEFORE YOU GOT MONEY TO BUY MORE.: NEVER TRUE

## 2023-08-17 SDOH — ECONOMIC STABILITY: HOUSING INSECURITY
IN THE LAST 12 MONTHS, WAS THERE A TIME WHEN YOU DID NOT HAVE A STEADY PLACE TO SLEEP OR SLEPT IN A SHELTER (INCLUDING NOW)?: NO

## 2023-08-17 SDOH — ECONOMIC STABILITY: INCOME INSECURITY: HOW HARD IS IT FOR YOU TO PAY FOR THE VERY BASICS LIKE FOOD, HOUSING, MEDICAL CARE, AND HEATING?: NOT HARD AT ALL

## 2023-08-17 SDOH — ECONOMIC STABILITY: FOOD INSECURITY: WITHIN THE PAST 12 MONTHS, THE FOOD YOU BOUGHT JUST DIDN'T LAST AND YOU DIDN'T HAVE MONEY TO GET MORE.: NEVER TRUE

## 2023-08-17 ASSESSMENT — ENCOUNTER SYMPTOMS
COUGH: 0
NAUSEA: 0
ABDOMINAL PAIN: 0
SHORTNESS OF BREATH: 0

## 2023-08-17 ASSESSMENT — PATIENT HEALTH QUESTIONNAIRE - PHQ9
SUM OF ALL RESPONSES TO PHQ QUESTIONS 1-9: 0
SUM OF ALL RESPONSES TO PHQ9 QUESTIONS 1 & 2: 0
1. LITTLE INTEREST OR PLEASURE IN DOING THINGS: 0
SUM OF ALL RESPONSES TO PHQ QUESTIONS 1-9: 0
2. FEELING DOWN, DEPRESSED OR HOPELESS: 0
SUM OF ALL RESPONSES TO PHQ QUESTIONS 1-9: 0
SUM OF ALL RESPONSES TO PHQ QUESTIONS 1-9: 0

## 2023-08-17 NOTE — PROGRESS NOTES
sounds: Normal breath sounds. Abdominal:      General: Bowel sounds are normal.      Palpations: Abdomen is soft. Musculoskeletal:         General: Tenderness present. Right shoulder: Tenderness present. Left shoulder: Tenderness present. Right wrist: Tenderness present. Left wrist: Tenderness present. Cervical back: Normal range of motion and neck supple. Right knee: Tenderness present. Left knee: Tenderness present. Skin:     General: Skin is warm and dry. Neurological:      Mental Status: She is alert and oriented to person, place, and time. ASSESSMENT/PLAN:   Diagnosis Orders   1. Generalized joint pain  Homocysteine    Sedimentation Rate    Uric Acid    NABILA    Rheumatoid Factor    CCP Antibodies, IGG/IGA    C-Reactive Protein    predniSONE (DELTASONE) 20 MG tablet      2. Joint stiffness  Homocysteine    Sedimentation Rate    Uric Acid    NABILA    Rheumatoid Factor    CCP Antibodies, IGG/IGA    C-Reactive Protein    predniSONE (DELTASONE) 20 MG tablet      3. SVT (supraventricular tachycardia) (HCC)              MDM:  Arthritis Panel  Prednisone Taper  Call with recs after testing    An electronic signature was used to authenticate this note.     --Zaheer Benavides, RONIT - CNP

## 2023-08-18 LAB — HOMOCYSTEINE: 6.6 UMOL/L

## 2023-08-19 LAB — CYCLIC CITRULLINATED PEPTIDE ANTIBODY IGG: 0.7 U/ML (ref 0–7)

## 2023-08-20 LAB — NUCLEAR IGG SER QL IA: NORMAL

## 2023-08-31 ENCOUNTER — OFFICE VISIT (OUTPATIENT)
Dept: CARDIOLOGY CLINIC | Age: 43
End: 2023-08-31
Payer: COMMERCIAL

## 2023-08-31 VITALS
BODY MASS INDEX: 35.85 KG/M2 | HEART RATE: 78 BPM | HEIGHT: 64 IN | WEIGHT: 210 LBS | SYSTOLIC BLOOD PRESSURE: 142 MMHG | DIASTOLIC BLOOD PRESSURE: 80 MMHG

## 2023-08-31 DIAGNOSIS — I47.1 SVT (SUPRAVENTRICULAR TACHYCARDIA) (HCC): Primary | ICD-10-CM

## 2023-08-31 DIAGNOSIS — I10 PRIMARY HYPERTENSION: ICD-10-CM

## 2023-08-31 PROCEDURE — 3077F SYST BP >= 140 MM HG: CPT | Performed by: NUCLEAR MEDICINE

## 2023-08-31 PROCEDURE — 99213 OFFICE O/P EST LOW 20 MIN: CPT | Performed by: NUCLEAR MEDICINE

## 2023-08-31 PROCEDURE — 3079F DIAST BP 80-89 MM HG: CPT | Performed by: NUCLEAR MEDICINE

## 2023-08-31 RX ORDER — METOPROLOL SUCCINATE 50 MG/1
50 TABLET, EXTENDED RELEASE ORAL DAILY
Qty: 30 TABLET | Refills: 11 | Status: SHIPPED | OUTPATIENT
Start: 2023-08-31

## 2023-08-31 NOTE — PROGRESS NOTES
[address] [address]  Dept: [phone]  Dept Fax: [phone]  Loc: [phone]    Visit Date: 8/31/2023    Kim Sewell is a 37 y.o. female who presents todayfor:  Chief Complaint   Patient presents with    Check-Up    Hypertension    Palpitations   Known SVT   Seems more stable  No recent episodes  No chest pain   No changes in breathing  No dizziness  No syncope        HPI:  HPI  Past Medical History:   Diagnosis Date    Concussion     Conversion disorder with weakness or paralysis, acute episode     Expressive aphasia     Pneumonia       Past Surgical History:   Procedure Laterality Date    MARVIN AND BSO (CERVIX REMOVED) N/A 1/10/2022    TOTAL ABDOMINAL HYSTERECTOMY WITH BILATERAL SALPINGECTOMY POSS BILATERAL OOPHORECTOMY performed by Julio Garcia MD at [address] [place] [address]       Family History   Problem Relation Age of Onset    High Blood Pressure Mother     High Cholesterol Mother     High Blood Pressure Father     Kidney Disease Father     Cancer Father     Cancer Maternal Grandmother     Cancer Maternal Grandfather     Depression Maternal Grandfather     Cancer Paternal Grandmother     Cancer Paternal Grandfather     Early Death Other     Heart Disease Other     High Blood Pressure Sister     Other Sister         seizures     Social History     Tobacco Use    Smoking status: Never     Passive exposure: Never    Smokeless tobacco: Never   Substance Use Topics    Alcohol use: Yes     Comment: occasionally      Current Outpatient Medications   Medication Sig Dispense Refill    metoprolol succinate (TOPROL XL) 50 MG extended release tablet Take 1 tablet by mouth daily 30 tablet 5    vitamin C (ASCORBIC ACID) 500 MG tablet Take 1 tablet by mouth daily      NONFORMULARY menapause support      ibuprofen (ADVIL;MOTRIN) 800 MG tablet Take 1 tablet by mouth every 8 hours as needed for Pain 30 tablet 3

## 2023-10-03 ENCOUNTER — TELEPHONE (OUTPATIENT)
Dept: FAMILY MEDICINE CLINIC | Age: 43
End: 2023-10-03

## 2023-10-03 DIAGNOSIS — M25.60 JOINT STIFFNESS: ICD-10-CM

## 2023-10-03 DIAGNOSIS — M25.50 GENERALIZED JOINT PAIN: ICD-10-CM

## 2023-10-03 RX ORDER — METHYLPREDNISOLONE 4 MG/1
TABLET ORAL
Qty: 1 KIT | Refills: 0 | Status: SHIPPED | OUTPATIENT
Start: 2023-10-03 | End: 2023-10-09

## 2023-10-03 NOTE — TELEPHONE ENCOUNTER
The patient called in and stated that she was seen in the office a while ago for all over joint pain and received a script for prednisone and a referral to pain mgt. She states that her appt with pain is not until 10/16. She is asking for another script of prednisone be sent into [facility]. Please advise.         If no call back the patient will check with her pharmacy after 4:00pm

## (undated) DEVICE — TOTAL TRAY, DB, 100% SILI FOLEY, 16FR 10: Brand: MEDLINE

## (undated) DEVICE — SUTURE VCRL + SZ 0 L27IN ABSRB VLT L36MM CT-1 1/2 CIR VCPB260H

## (undated) DEVICE — DRESSING ANTIMIC W3.5XL14IN IONIC SIL FOAM POSTOP STRP

## (undated) DEVICE — GLOVE SURG SZ 6 THK91MIL LTX FREE SYN POLYISOPRENE ANTI

## (undated) DEVICE — SPONGE LAP W18XL18IN WHT COT 4 PLY FLD STRUNG RADPQ DISP ST

## (undated) DEVICE — SUTURE VCRL + SZ 0 L18IN ABSRB TIE VCP106G

## (undated) DEVICE — SPONGE GZ W4XL4IN COT 12 PLY TYP VII WVN C FLD DSGN

## (undated) DEVICE — AGENT HEMSTAT 3GM OXIDIZED REGENERATED CELOS ABSRB FOR CONT (ORDER MULTIPLES OF 5EA)

## (undated) DEVICE — SUTURE PLN GUT SZ 3-0 L27IN ABSRB YELLOWISH TAN L36MM CT-1 842H

## (undated) DEVICE — SUTURE VCRL SZ 1 L36IN ABSRB UD CTX L48MM 1/2 CIR J977H

## (undated) DEVICE — BREAST HERNIA PACK: Brand: MEDLINE INDUSTRIES, INC.

## (undated) DEVICE — SUTURE VCRL + SZ 2-0 L27IN ABSRB CLR CT-1 1/2 CIR TAPERCUT VCP259H

## (undated) DEVICE — SUTURE VCRL SZ 4-0 L27IN ABSRB UD L60MM KS STR REV CUT NDL J662H

## (undated) DEVICE — SPONGE,PEANUT,XRAY,ST,SM,3/8",5/CARD: Brand: MEDLINE INDUSTRIES, INC.

## (undated) DEVICE — PAD,NON-ADHERENT,3X8,STERILE,LF,1/PK: Brand: MEDLINE

## (undated) DEVICE — PENCIL SMK EVAC ALL IN 1 DSGN ENH VISIBILITY IMPROVED AIR

## (undated) DEVICE — SUTURE ABSORBABLE BRAIDED 0 CT-1 8X27 IN UD VICRYL JJ41G